# Patient Record
Sex: FEMALE | Race: WHITE | NOT HISPANIC OR LATINO | Employment: FULL TIME | URBAN - NONMETROPOLITAN AREA
[De-identification: names, ages, dates, MRNs, and addresses within clinical notes are randomized per-mention and may not be internally consistent; named-entity substitution may affect disease eponyms.]

---

## 2017-05-31 DIAGNOSIS — Z30.09 GENERAL COUNSELING FOR PRESCRIPTION OF ORAL CONTRACEPTIVES: ICD-10-CM

## 2017-06-01 RX ORDER — DROSPIRENONE AND ETHINYL ESTRADIOL TABLETS 0.02-3(28)
KIT ORAL
Qty: 84 TABLET | Refills: 0 | Status: SHIPPED | OUTPATIENT
Start: 2017-06-01 | End: 2017-06-23

## 2017-06-23 ENCOUNTER — OFFICE VISIT (OUTPATIENT)
Dept: OBGYN | Facility: OTHER | Age: 27
End: 2017-06-23
Attending: NURSE PRACTITIONER
Payer: COMMERCIAL

## 2017-06-23 VITALS
DIASTOLIC BLOOD PRESSURE: 64 MMHG | WEIGHT: 174 LBS | HEIGHT: 61 IN | BODY MASS INDEX: 32.85 KG/M2 | SYSTOLIC BLOOD PRESSURE: 102 MMHG | HEART RATE: 72 BPM

## 2017-06-23 DIAGNOSIS — Z30.09 GENERAL COUNSELING FOR PRESCRIPTION OF ORAL CONTRACEPTIVES: ICD-10-CM

## 2017-06-23 DIAGNOSIS — Z01.419 WELL WOMAN EXAM WITH ROUTINE GYNECOLOGICAL EXAM: Primary | ICD-10-CM

## 2017-06-23 PROCEDURE — 99395 PREV VISIT EST AGE 18-39: CPT | Performed by: NURSE PRACTITIONER

## 2017-06-23 RX ORDER — DROSPIRENONE AND ETHINYL ESTRADIOL 0.02-3(28)
1 KIT ORAL DAILY
Qty: 84 TABLET | Refills: 4 | Status: SHIPPED | OUTPATIENT
Start: 2017-06-23 | End: 2018-07-20

## 2017-06-23 ASSESSMENT — PAIN SCALES - GENERAL: PAINLEVEL: NO PAIN (0)

## 2017-06-23 NOTE — NURSING NOTE
"Chief Complaint   Patient presents with     Physical       Initial /64  Pulse 72  Ht 5' 1\" (1.549 m)  Wt 174 lb (78.9 kg)  LMP 06/11/2017 (Approximate)  Breastfeeding? No  BMI 32.88 kg/m2 Estimated body mass index is 32.88 kg/(m^2) as calculated from the following:    Height as of this encounter: 5' 1\" (1.549 m).    Weight as of this encounter: 174 lb (78.9 kg).  Medication Reconciliation: complete     WHIT BRITO      "

## 2017-06-23 NOTE — MR AVS SNAPSHOT
After Visit Summary   6/23/2017    Sandra Sarabia    MRN: [id number]           Patient Information     Date Of Birth          1990        Visit Information        Provider Department      6/23/2017 3:30 PM Marisol Jeffery NP [facility]        Today's Diagnoses     Well woman exam with routine gynecological exam    -  1    General counseling for prescription of oral contraceptives          Care Instructions      Breast Health: Breast Self-Awareness  What is breast self-awareness?  Breast self-awareness is knowing how your breasts normally look and feel. Your breasts change as you go through different stages of your life. So it s important to learn what is normal for your breasts. Breast self-awareness helps you notice any changes in your breasts right away. Report any changes to your healthcare provider.  Why is breast self-awareness important?  Many experts now say that women should focus on breast self-awareness instead of doing a breast self-examination (BSE). These experts include the American Cancer Society, the U.S. Preventive Services Task Force, and the American Congress of Obstetricians and Gynecologists. Some experts even advise not teaching women to do a BSE. That s because research hasn t shown a clear benefit to doing BSEs.  Breast self-awareness is different than a BSE. Breast self-awareness isn t about following a certain method and schedule. It s about knowing what's normal for your breasts. That way you can notice even small changes right away. If you see any changes, report them to your healthcare provider.  Changes to look for  Call your healthcare provider if you find any changes in your breasts that concern you. These changes may include:    A lump    Nipple discharge other than breast milk, especially a bloody discharge    Swelling    A change in size or shape    Skin irritation, such as redness, thickening, or dimpling of the skin    Swollen lymph nodes in the  armpit    Nipple problems, such as pain or redness  If you find a lump  Contact your provider if you find lumpiness in one breast, feel something different in the tissue, or feel a definite lump. Sometimes lumpiness may be due to menstrual changes. But there may be reason for concern.  Your provider may want to see you right away if you have:    Nipple discharge that is bloody    Skin changes on your breast, such as dimpling or puckering  It s normal to be upset if you find a lump. But it s important to contact your provider right away. Remember that most breast lumps are benign. This means they are not cancer.  Date Last Reviewed: 8/10/2015    5064-7286 [employer]. [address] [place] [address], [place] [address]. All rights reserved. This information is not intended as a substitute for professional medical care. Always follow your healthcare professional's instructions.                Follow-ups after your visit        Follow-up notes from your care team     Return in about 1 year (around 6/23/2018).      Who to contact     If you have questions or need follow up information about today's clinic visit or your schedule please contact [facility] directly at [phone].  Normal or non-critical lab and imaging results will be communicated to you by MyChart, letter or phone within 4 business days after the clinic has received the results. If you do not hear from us within 7 days, please contact the clinic through [facility] or phone. If you have a critical or abnormal lab result, we will notify you by phone as soon as possible.  Submit refill requests through [employer] or call your pharmacy and they will forward the refill request to us. Please allow 3 business days for your refill to be completed.          Additional Information About Your Visit        [facility] Mail Information     [employer] gives you secure access to your electronic health record. If you see a primary care provider, you can also send  "messages to your care team and make appointments. If you have questions, please call your primary care clinic.  If you do not have a primary care provider, please call [phone] and they will assist you.        Care EveryWhere ID     This is your Care EveryWhere ID. This could be used by other organizations to access your [facility] [place] medical records  [id number]        Your Vitals Were     Pulse Height Last Period Breastfeeding? BMI (Body Mass Index)       72 5' 1\" (1.549 m) 06/11/2017 (Approximate) No 32.88 kg/m2        Blood Pressure from Last 3 Encounters:   06/23/17 102/64   07/21/16 108/68   07/13/16 104/68    Weight from Last 3 Encounters:   06/23/17 174 lb (78.9 kg)   07/21/16 178 lb (80.7 kg)   07/13/16 180 lb (81.6 kg)              Today, you had the following     No orders found for display         Today's Medication Changes          These changes are accurate as of: 6/23/17 11:59 PM.  If you have any questions, ask your nurse or doctor.               These medicines have changed or have updated prescriptions.        Dose/Directions    drospirenone-ethinyl estradiol 3-0.02 MG per tablet   Commonly known as:  LORYNA   This may have changed:  See the new instructions.   Used for:  General counseling for prescription of oral contraceptives   Changed by:  Marisol Jeffery, NP        Dose:  1 tablet   Take 1 tablet by mouth daily   Quantity:  84 tablet   Refills:  4            Where to get your medicines      These medications were sent to [place] [address] [place] [address] [address]     Phone:  [phone]     drospirenone-ethinyl estradiol 3-0.02 MG per tablet                Primary Care Provider    None Specified       No primary provider on file.        Equal Access to Services     OUMOU WYATT : Arthur Watson, stephon chavira, harlan krishna. [facility] [phone].    ATENCIÓN: Si habla " español, tiene a javier disposición servicios gratuitos de asistencia lingüística. Lila goins [phone].    We comply with applicable federal civil rights laws and [place] laws. We do not discriminate on the basis of race, color, national origin, age, disability sex, sexual orientation or gender identity.            Thank you!     [facility]  [facility]. Our goal is always to provide you with excellent care. Hearing back from our patients is one way we can continue to improve our services. Please take a few minutes to complete the written survey that you may receive in the mail after your visit with us. Thank you!             Your Updated Medication List - Protect others around you: Learn how to safely use, store and throw away your medicines at www.disposemymeds.org.          This list is accurate as of: 6/23/17 11:59 PM.  Always use your most recent med list.                   Brand Name Dispense Instructions for use Diagnosis    drospirenone-ethinyl estradiol 3-0.02 MG per tablet    LORYNA    84 tablet    Take 1 tablet by mouth daily    General counseling for prescription of oral contraceptives

## 2017-06-27 NOTE — PROGRESS NOTES
"CC:  Annual exam  HPI:  Sandra Sarabia is a 26 year old female  Patient's last menstrual period was 06/11/2017 (approximate).  She would like to refill her BCP.  She is not established with a PCP.  Information provided. No other c/o.      Past GYN history:  No STD history  STI testing offered?  Declined       Last PAP smear:  Normal  Mammograms up to date: not applicable    No past medical history on file.    Past Surgical History:   Procedure Laterality Date     SOFT TISSUE SURGERY      right inner thigh I and D       Family History   Problem Relation Age of Onset     Hypertension Mother      Coronary Artery Disease Maternal Grandmother      DIABETES Paternal Grandfather      Hyperlipidemia No family hx of      Asthma No family hx of      Thyroid Disease No family hx of      Colon Cancer No family hx of      Breast Cancer No family hx of        Current Outpatient Prescriptions   Medication Sig Dispense Refill     drospirenone-ethinyl estradiol (LORYNA) 3-0.02 MG per tablet Take 1 tablet by mouth daily 84 tablet 4       Allergies: Amoxicillin and Penicillins    ROS:  C: NEGATIVE for fever, chills, change in weight  I: NEGATIVE for worrisome rashes, moles or lesions  E: NEGATIVE for vision changes or irritation  E/M: NEGATIVE for ear, mouth and throat problems  R: NEGATIVE for significant cough or SOB  CV: NEGATIVE for chest pain, palpitations or peripheral edema  GI: NEGATIVE for nausea, abdominal pain, heartburn, or change in bowel habits  : NEGATIVE for frequency, dysuria, hematuria, vaginal discharge, or irregular bleeding  M: NEGATIVE for significant arthralgias or myalgia  N: NEGATIVE for weakness, dizziness or paresthesias  E: NEGATIVE for temperature intolerance, skin/hair changes  P: NEGATIVE for changes in mood or affect    EXAM:  Blood pressure 102/64, pulse 72, height 5' 1\" (1.549 m), weight 174 lb (78.9 kg), last menstrual period 06/11/2017, not currently breastfeeding.   BMI= Body mass index is 32.88 " kg/(m^2).  General - pleasant female in no acute distress.  Neck - supple without lymphadenopathy or thyromegaly.  Lungs - clear to auscultation bilaterally.  Heart - regular rate and rhythm without murmur.  Breast - no nodularity, asymmetry or nipple discharge bilaterally.  Abdomen - soft, nontender, nondistended, no hepatosplenomegaly.  Pelvic - EG: normal adult female, BUS: within normal limits, Vagina: well rugated, no discharge, Cervix: no lesions or CMT, Uterus: firm, normal sized and nontender, Adnexae: no masses or tenderness.  Rectovaginal - deferred.  Musculoskeletal - no gross deformities.  Neurological - normal strength, sensation, and mental status.      ASSESSMENT/PLAN:  (Z01.419) Encounter for gynecological examination without abnormal finding  (primary encounter diagnosis)  Comment:   Plan: return in 1 year    (Z30.09) General counseling for prescription of oral contraceptives  Comment:   Plan: drospirenone-ethinyl estradiol (LORYNA) 3-0.02         MG per tablet              Discussed exercise and healthy eating, including calcium intake.  She should return to the clinic in one year, or sooner if problems arise.

## 2017-06-27 NOTE — PATIENT INSTRUCTIONS
Breast Health: Breast Self-Awareness  What is breast self-awareness?  Breast self-awareness is knowing how your breasts normally look and feel. Your breasts change as you go through different stages of your life. So it s important to learn what is normal for your breasts. Breast self-awareness helps you notice any changes in your breasts right away. Report any changes to your healthcare provider.  Why is breast self-awareness important?  Many experts now say that women should focus on breast self-awareness instead of doing a breast self-examination (BSE). These experts include the American Cancer Society, the U.S. Preventive Services Task Force, and the American Congress of Obstetricians and Gynecologists. Some experts even advise not teaching women to do a BSE. That s because research hasn t shown a clear benefit to doing BSEs.  Breast self-awareness is different than a BSE. Breast self-awareness isn t about following a certain method and schedule. It s about knowing what's normal for your breasts. That way you can notice even small changes right away. If you see any changes, report them to your healthcare provider.  Changes to look for  Call your healthcare provider if you find any changes in your breasts that concern you. These changes may include:    A lump    Nipple discharge other than breast milk, especially a bloody discharge    Swelling    A change in size or shape    Skin irritation, such as redness, thickening, or dimpling of the skin    Swollen lymph nodes in the armpit    Nipple problems, such as pain or redness  If you find a lump  Contact your provider if you find lumpiness in one breast, feel something different in the tissue, or feel a definite lump. Sometimes lumpiness may be due to menstrual changes. But there may be reason for concern.  Your provider may want to see you right away if you have:    Nipple discharge that is bloody    Skin changes on your breast, such as dimpling or puckering  It s  normal to be upset if you find a lump. But it s important to contact your provider right away. Remember that most breast lumps are benign. This means they are not cancer.  Date Last Reviewed: 8/10/2015    2131-2518 [employer]. [address] [place] [address], [place] [address]. All rights reserved. This information is not intended as a substitute for professional medical care. Always follow your healthcare professional's instructions.

## 2018-07-20 ENCOUNTER — OFFICE VISIT (OUTPATIENT)
Dept: OBGYN | Facility: OTHER | Age: 28
End: 2018-07-20
Attending: NURSE PRACTITIONER
Payer: COMMERCIAL

## 2018-07-20 VITALS
HEART RATE: 76 BPM | BODY MASS INDEX: 33.82 KG/M2 | OXYGEN SATURATION: 99 % | DIASTOLIC BLOOD PRESSURE: 62 MMHG | SYSTOLIC BLOOD PRESSURE: 104 MMHG | WEIGHT: 179 LBS

## 2018-07-20 DIAGNOSIS — Z12.4 SCREENING FOR CERVICAL CANCER: Primary | ICD-10-CM

## 2018-07-20 DIAGNOSIS — Z01.419 WELL WOMAN EXAM WITH ROUTINE GYNECOLOGICAL EXAM: ICD-10-CM

## 2018-07-20 DIAGNOSIS — Z30.09 GENERAL COUNSELING FOR PRESCRIPTION OF ORAL CONTRACEPTIVES: ICD-10-CM

## 2018-07-20 PROCEDURE — 87624 HPV HI-RISK TYP POOLED RSLT: CPT | Mod: 90 | Performed by: NURSE PRACTITIONER

## 2018-07-20 PROCEDURE — 99000 SPECIMEN HANDLING OFFICE-LAB: CPT | Performed by: NURSE PRACTITIONER

## 2018-07-20 PROCEDURE — 99395 PREV VISIT EST AGE 18-39: CPT | Performed by: NURSE PRACTITIONER

## 2018-07-20 PROCEDURE — 88142 CYTOPATH C/V THIN LAYER: CPT | Performed by: NURSE PRACTITIONER

## 2018-07-20 RX ORDER — DROSPIRENONE AND ETHINYL ESTRADIOL 0.02-3(28)
1 KIT ORAL DAILY
Qty: 84 TABLET | Refills: 4 | Status: SHIPPED | OUTPATIENT
Start: 2018-07-20 | End: 2019-08-27

## 2018-07-20 ASSESSMENT — PAIN SCALES - GENERAL: PAINLEVEL: NO PAIN (0)

## 2018-07-20 NOTE — PATIENT INSTRUCTIONS
Clinical Breast Exam    Many health organizations recommend a yearly clinical breast exam. This exam may be done by a gynecologist, family healthcare provider, nurse practitioner, nurse midwife, or specially trained nurse. Yearly breast exams help to make sure that breast conditions are found early.  Your healthcare provider s role  A healthcare professional knows the tests and follow-up care needed if a problem is found. Your clinical exam is also a great time to ask questions about breast self-exams. You can find out if you re checking your breasts in the best way. Or you may want to ask how pregnancy, breast implants, or breast reduction surgery affect the way you should check your breasts.  Diagnostic tests  If a clinical exam reveals a breast change, you may have other tests to find out more. These tests may include:    Mammography. A low-dose X-ray of your breast tissue.    Ultrasound. An imaging test that uses sound waves to create images of your breast.    Biopsy. A small amount of breast tissue is removed by needle or by a cut (incision). The tissue is then checked under a microscope.  Guidelines for having clinical breast exams  The American College of Obstetricians and Gynecologists recommends that starting at age 29, you should have a clinical breast exam every 1 to 3 years. After age 40, have a clinical breast exam each year. If you re at higher risk for breast cancer, you may need exams more often. Risk factors for breast cancer may include:    Being over 50 or postmenopausal    Having a family history of breast cancer    Having the BRCA1 or BRCA2 gene mutation or certain other gene mutations    Having more menstrual periods due to starting menstruation early (before age 12) or having a late menopause (after age 55)    Having no pregnancies    Having a first pregnancy after age 30    Being obese    Having a history of radiation treatment to your chest area    Exposure to BALBIR during your mother's  pregnancy    Not being active    Drinking too much alcohol    Having dense breast tissue    Taking hormone therapy after menopause  Other health organizations have different recommendations. Talk with your healthcare provider about what is best for you.   Date Last Reviewed: 8/1/2017 2000-2017 [employer]. [address] [place] [address], [place] [address]. All rights reserved. This information is not intended as a substitute for professional medical care. Always follow your healthcare professional's instructions.        Breast Health: Breast Self-Awareness  What is breast self-awareness?  Breast self-awareness is knowing how your breasts normally look and feel. Your breasts change as you go through different stages of your life. So it s important to learn what is normal for your breasts. Breast self-awareness helps you notice any changes in your breasts right away. Report any changes to your healthcare provider.  Why is breast self-awareness important?  Many experts now say that women should focus on breast self-awareness instead of doing a breast self-examination (BSE). These experts include the American Cancer Society, the U.S. Preventive Services Task Force, and the American Congress of Obstetricians and Gynecologists. Some experts even advise not teaching women to do a BSE. That s because research hasn t shown a clear benefit to doing BSEs.  Breast self-awareness is different than a BSE. Breast self-awareness isn t about following a certain method and schedule. It s about knowing what's normal for your breasts. That way you can notice even small changes right away. If you see any changes, report them to your healthcare provider.  Changes to look for  Call your healthcare provider if you find any changes in your breasts that concern you. These changes may include:    A lump    Nipple discharge other than breastmilk, especially a bloody discharge    Swelling    A change in size or shape    Skin  irritation, such as redness, thickening, or dimpling of the skin    Swollen lymph nodes in the armpit    Nipple problems, such as pain or redness  If you find a lump  Contact your provider if you find lumpiness in one breast, feel something different in the tissue, or feel a definite lump. Sometimes lumpiness may be due to menstrual changes. But there may be reason for concern.  Your provider may want to see you right away if you have:    Nipple discharge that is bloody    Skin changes on your breast, such as dimpling or puckering  It s normal to be upset if you find a lump. But it s important to contact your provider right away. Remember that most breast lumps are benign. This means they are not cancer.  Date Last Reviewed: 8/1/2017 2000-2017 [employer]. [address] [place] [address], [place] [address]. All rights reserved. This information is not intended as a substitute for professional medical care. Always follow your healthcare professional's instructions.

## 2018-07-20 NOTE — NURSING NOTE
"Chief Complaint   Patient presents with     Physical       Initial /62 (BP Location: Left arm, Patient Position: Sitting, Cuff Size: Adult Regular)  Pulse 76  Wt 179 lb (81.2 kg)  SpO2 99%  BMI 33.82 kg/m2 Estimated body mass index is 33.82 kg/(m^2) as calculated from the following:    Height as of 6/23/17: 5' 1\" (1.549 m).    Weight as of this encounter: 179 lb (81.2 kg).  Medication Reconciliation: complete    Kate Cowan LPN  "

## 2018-07-20 NOTE — MR AVS SNAPSHOT
After Visit Summary   7/20/2018    Sandra Sarabia    MRN: [id number]           Patient Information     Date Of Birth          1990        Visit Information        Provider Department      7/20/2018 11:00 AM Marisol Jeffery NP [facility] [place]        Today's Diagnoses     Screening for cervical cancer    -  1    General counseling for prescription of oral contraceptives        Well woman exam with routine gynecological exam          Care Instructions      Clinical Breast Exam    Many health organizations recommend a yearly clinical breast exam. This exam may be done by a gynecologist, family healthcare provider, nurse practitioner, nurse midwife, or specially trained nurse. Yearly breast exams help to make sure that breast conditions are found early.  Your healthcare provider s role  A healthcare professional knows the tests and follow-up care needed if a problem is found. Your clinical exam is also a great time to ask questions about breast self-exams. You can find out if you re checking your breasts in the best way. Or you may want to ask how pregnancy, breast implants, or breast reduction surgery affect the way you should check your breasts.  Diagnostic tests  If a clinical exam reveals a breast change, you may have other tests to find out more. These tests may include:    Mammography. A low-dose X-ray of your breast tissue.    Ultrasound. An imaging test that uses sound waves to create images of your breast.    Biopsy. A small amount of breast tissue is removed by needle or by a cut (incision). The tissue is then checked under a microscope.  Guidelines for having clinical breast exams  The American College of Obstetricians and Gynecologists recommends that starting at age 29, you should have a clinical breast exam every 1 to 3 years. After age 40, have a clinical breast exam each year. If you re at higher risk for breast cancer, you may need exams more often. Risk factors for breast  cancer may include:    Being over 50 or postmenopausal    Having a family history of breast cancer    Having the BRCA1 or BRCA2 gene mutation or certain other gene mutations    Having more menstrual periods due to starting menstruation early (before age 12) or having a late menopause (after age 55)    Having no pregnancies    Having a first pregnancy after age 30    Being obese    Having a history of radiation treatment to your chest area    Exposure to BALBIR during your mother's pregnancy    Not being active    Drinking too much alcohol    Having dense breast tissue    Taking hormone therapy after menopause  Other health organizations have different recommendations. Talk with your healthcare provider about what is best for you.   Date Last Reviewed: 8/1/2017 2000-2017 [employer]. [address] [place] [address], [place] [address]. All rights reserved. This information is not intended as a substitute for professional medical care. Always follow your healthcare professional's instructions.        Breast Health: Breast Self-Awareness  What is breast self-awareness?  Breast self-awareness is knowing how your breasts normally look and feel. Your breasts change as you go through different stages of your life. So it s important to learn what is normal for your breasts. Breast self-awareness helps you notice any changes in your breasts right away. Report any changes to your healthcare provider.  Why is breast self-awareness important?  Many experts now say that women should focus on breast self-awareness instead of doing a breast self-examination (BSE). These experts include the American Cancer Society, the U.S. Preventive Services Task Force, and the American Congress of Obstetricians and Gynecologists. Some experts even advise not teaching women to do a BSE. That s because research hasn t shown a clear benefit to doing BSEs.  Breast self-awareness is different than a BSE. Breast self-awareness isn t about  following a certain method and schedule. It s about knowing what's normal for your breasts. That way you can notice even small changes right away. If you see any changes, report them to your healthcare provider.  Changes to look for  Call your healthcare provider if you find any changes in your breasts that concern you. These changes may include:    A lump    Nipple discharge other than breastmilk, especially a bloody discharge    Swelling    A change in size or shape    Skin irritation, such as redness, thickening, or dimpling of the skin    Swollen lymph nodes in the armpit    Nipple problems, such as pain or redness  If you find a lump  Contact your provider if you find lumpiness in one breast, feel something different in the tissue, or feel a definite lump. Sometimes lumpiness may be due to menstrual changes. But there may be reason for concern.  Your provider may want to see you right away if you have:    Nipple discharge that is bloody    Skin changes on your breast, such as dimpling or puckering  It s normal to be upset if you find a lump. But it s important to contact your provider right away. Remember that most breast lumps are benign. This means they are not cancer.  Date Last Reviewed: 8/1/2017 2000-2017 [employer]. [address] [place] [address], [place] [address]. All rights reserved. This information is not intended as a substitute for professional medical care. Always follow your healthcare professional's instructions.                Follow-ups after your visit        Follow-up notes from your care team     Return in about 1 year (around 7/20/2019).      Who to contact     If you have questions or need follow up information about today's clinic visit or your schedule please contact [facility] directly at [phone].  Normal or non-critical lab and imaging results will be communicated to you by MyChart, letter or phone within 4 business days after the clinic has received the  results. If you do not hear from us within 7 days, please contact the clinic through [employer] or phone. If you have a critical or abnormal lab result, we will notify you by phone as soon as possible.  Submit refill requests through [employer] or call your pharmacy and they will forward the refill request to us. Please allow 3 business days for your refill to be completed.          Additional Information About Your Visit        [email or web address] Information     [employer] gives you secure access to your electronic health record. If you see a primary care provider, you can also send messages to your care team and make appointments. If you have questions, please call your primary care clinic.  If you do not have a primary care provider, please call [phone] and they will assist you.        Care EveryWhere ID     This is your Care EveryWhere ID. This could be used by other organizations to access your [place] medical records  [id number]        Your Vitals Were     Pulse Pulse Oximetry BMI (Body Mass Index)             76 99% 33.82 kg/m2          Blood Pressure from Last 3 Encounters:   07/20/18 104/62   06/23/17 102/64   07/21/16 108/68    Weight from Last 3 Encounters:   07/20/18 179 lb (81.2 kg)   06/23/17 174 lb (78.9 kg)   07/21/16 178 lb (80.7 kg)              We Performed the Following     A pap thin layer screen with  HPV - recommended age 30 - 65 years (select HPV order below)     HPV High Risk Types DNA Cervical          Where to get your medicines      These medications were sent to [facility] [address] [address] AT [facility] [address]  [facility] [address], [place] [address]     Phone:  [phone]     drospirenone-ethinyl estradiol 3-0.02 MG per tablet          Primary Care Provider    None Specified       No primary provider on file.        Equal Access to Services     ALVIN WYATT : stephon Cheng, harlan krishna  bulmaro delgadofelyashtyn pfeifferAlekimmie ah. So [facility] [phone].    ATENCIÓN: Si habla florin, tiene a javier disposición servicios gratuitos de asistencia lingüística. Lila al [phone].    We comply with applicable federal civil rights laws and [place] laws. We do not discriminate on the basis of race, color, national origin, age, disability, sex, sexual orientation, or gender identity.            Thank you!     Thank you for choosing [facility]  for your care. Our goal is always to provide you with excellent care. Hearing back from our patients is one way we can continue to improve our services. Please take a few minutes to complete the written survey that you may receive in the mail after your visit with us. Thank you!             Your Updated Medication List - Protect others around you: Learn how to safely use, store and throw away your medicines at www.disposemymeds.org.          This list is accurate as of 7/20/18 11:24 AM.  Always use your most recent med list.                   Brand Name Dispense Instructions for use Diagnosis    drospirenone-ethinyl estradiol 3-0.02 MG per tablet    LORYNA    84 tablet    Take 1 tablet by mouth daily    General counseling for prescription of oral contraceptives

## 2018-07-20 NOTE — PROGRESS NOTES
CC:  Annual exam  HPI:  Sandra Sarabia is a 27 year old female P0 No LMP recorded.  Would like to refill her BCP.  Declines discussion of smoking cessation.  Does not get regular exercise.  No other c/o.      Past GYN history:  No STD history  STI testing offered?  Declined       Last PAP smear:  Normal  Mammograms up to date: not applicable      No past medical history on file.    Past Surgical History:   Procedure Laterality Date     SOFT TISSUE SURGERY      right inner thigh I and D       Family History   Problem Relation Age of Onset     Hypertension Mother      Coronary Artery Disease Maternal Grandmother      Diabetes Paternal Grandfather      Hyperlipidemia No family hx of      Asthma No family hx of      Thyroid Disease No family hx of      Colon Cancer No family hx of      Breast Cancer No family hx of        Current Outpatient Prescriptions   Medication Sig Dispense Refill     drospirenone-ethinyl estradiol (LORYNA) 3-0.02 MG per tablet Take 1 tablet by mouth daily 84 tablet 4     [DISCONTINUED] drospirenone-ethinyl estradiol (LORYNA) 3-0.02 MG per tablet Take 1 tablet by mouth daily 84 tablet 4       Allergies: Amoxicillin and Penicillins    ROS:  CONSTITUTIONAL:NEGATIVE for fever, chills, change in weight  INTEGUMENTARY/SKIN: NEGATIVE for worrisome rashes, moles or lesions  ENT/MOUTH: NEGATIVE for ear, mouth and throat problems  RESP:NEGATIVE for significant cough or SOB  BREAST: NEGATIVE for masses, tenderness or discharge  CV: NEGATIVE for chest pain, palpitations or peripheral edema  GI: NEGATIVE for nausea, abdominal pain, heartburn, or change in bowel habits  : negative for, dysparunia, incontinence and vaginal discharge  ENDOCRINE: NEGATIVE for temperature intolerance, skin/hair changes    EXAM:  Blood pressure 104/62, pulse 76, weight 179 lb (81.2 kg), SpO2 99 %, not currently breastfeeding.   BMI= Body mass index is 33.82 kg/(m^2).  General - pleasant female in no acute distress.  Neck - supple  without lymphadenopathy or thyromegaly.  Lungs - clear to auscultation bilaterally.  Heart - regular rate and rhythm without murmur.  Breast - no nodularity, asymmetry or nipple discharge bilaterally.  Abdomen - soft, nontender, nondistended, no hepatosplenomegaly.  Pelvic - EG: normal adult female, BUS: within normal limits, Vagina: well rugated, no discharge, Cervix: no lesions or CMT, Uterus: firm, normal sized and nontender, Adnexae: no masses or tenderness.  Rectovaginal - deferred.  Musculoskeletal - no gross deformities.  Neurological - normal strength, sensation, and mental status.      ASSESSMENT/PLAN:  (Z12.4) Screening for cervical cancer  (primary encounter diagnosis)  Comment:   Plan: A pap thin layer screen with  HPV - recommended        age 30 - 65 years (select HPV order below), HPV        High Risk Types DNA Cervical            (Z30.09) General counseling for prescription of oral contraceptives  Comment:   Plan: drospirenone-ethinyl estradiol (LORYNA) 3-0.02         MG per tablet            (Z01.419) Well woman exam with routine gynecological exam  Comment:   Plan: smoking cessation and regular exercise encouraged.      Discussed exercise and healthy eating, including calcium intake.  She should return to the clinic in one year, or sooner if problems arise.

## 2018-07-21 ASSESSMENT — PATIENT HEALTH QUESTIONNAIRE - PHQ9: SUM OF ALL RESPONSES TO PHQ QUESTIONS 1-9: 0

## 2018-07-26 LAB
COPATH REPORT: NORMAL
PAP: NORMAL

## 2018-07-27 LAB
FINAL DIAGNOSIS: NORMAL
HPV HR 12 DNA CVX QL NAA+PROBE: NEGATIVE
HPV16 DNA SPEC QL NAA+PROBE: NEGATIVE
HPV18 DNA SPEC QL NAA+PROBE: NEGATIVE
SPECIMEN DESCRIPTION: NORMAL
SPECIMEN SOURCE CVX/VAG CYTO: NORMAL

## 2019-08-27 ENCOUNTER — OFFICE VISIT (OUTPATIENT)
Dept: OBGYN | Facility: OTHER | Age: 29
End: 2019-08-27
Attending: NURSE PRACTITIONER
Payer: COMMERCIAL

## 2019-08-27 VITALS
HEIGHT: 61 IN | DIASTOLIC BLOOD PRESSURE: 73 MMHG | OXYGEN SATURATION: 97 % | WEIGHT: 190 LBS | BODY MASS INDEX: 35.87 KG/M2 | SYSTOLIC BLOOD PRESSURE: 110 MMHG | HEART RATE: 92 BPM

## 2019-08-27 DIAGNOSIS — Z30.09 GENERAL COUNSELING FOR PRESCRIPTION OF ORAL CONTRACEPTIVES: ICD-10-CM

## 2019-08-27 DIAGNOSIS — Z71.6 TOBACCO ABUSE COUNSELING: ICD-10-CM

## 2019-08-27 DIAGNOSIS — Z12.4 SCREENING FOR CERVICAL CANCER: Primary | ICD-10-CM

## 2019-08-27 DIAGNOSIS — Z01.419 WELL WOMAN EXAM WITH ROUTINE GYNECOLOGICAL EXAM: ICD-10-CM

## 2019-08-27 PROCEDURE — 87624 HPV HI-RISK TYP POOLED RSLT: CPT | Mod: 90 | Performed by: NURSE PRACTITIONER

## 2019-08-27 PROCEDURE — G0123 SCREEN CERV/VAG THIN LAYER: HCPCS | Performed by: NURSE PRACTITIONER

## 2019-08-27 PROCEDURE — 99000 SPECIMEN HANDLING OFFICE-LAB: CPT | Performed by: NURSE PRACTITIONER

## 2019-08-27 PROCEDURE — 99395 PREV VISIT EST AGE 18-39: CPT | Performed by: NURSE PRACTITIONER

## 2019-08-27 RX ORDER — DROSPIRENONE AND ETHINYL ESTRADIOL 0.02-3(28)
1 KIT ORAL DAILY
Qty: 84 TABLET | Refills: 4 | Status: SHIPPED | OUTPATIENT
Start: 2019-08-27 | End: 2020-10-09

## 2019-08-27 ASSESSMENT — MIFFLIN-ST. JEOR: SCORE: 1519.21

## 2019-08-27 ASSESSMENT — PATIENT HEALTH QUESTIONNAIRE - PHQ9: SUM OF ALL RESPONSES TO PHQ QUESTIONS 1-9: 0

## 2019-08-27 ASSESSMENT — PAIN SCALES - GENERAL: PAINLEVEL: NO PAIN (0)

## 2019-08-27 NOTE — NURSING NOTE
"Chief Complaint   Patient presents with     Annual Visit       Initial /73 (BP Location: Right arm, Patient Position: Sitting, Cuff Size: Adult Regular)   Pulse 92   Ht 1.549 m (5' 1\")   Wt 86.2 kg (190 lb)   SpO2 97%   BMI 35.90 kg/m   Estimated body mass index is 35.9 kg/m  as calculated from the following:    Height as of this encounter: 1.549 m (5' 1\").    Weight as of this encounter: 86.2 kg (190 lb).  Medication Reconciliation: complete     Naatlie Vera LPN      "

## 2019-08-27 NOTE — PATIENT INSTRUCTIONS
Patient Education     Breast Health: Breast Self-Awareness  What is breast self-awareness?  Breast self-awareness is knowing how your breasts normally look and feel. Your breasts change as you go through different stages of your life. So it s important to learn what is normal for your breasts. Breast self-awareness helps you notice any changes in your breasts right away. Report any changes to your healthcare provider.  Why is breast self-awareness important?  Many experts now say that women should focus on breast self-awareness instead of doing a breast self-examination (BSE). These experts include the American Cancer Society, the U.S. Preventive Services Task Force, and the American Congress of Obstetricians and Gynecologists. Some experts even advise not teaching women to do a BSE. That s because research hasn t shown a clear benefit to doing BSEs.  Breast self-awareness is different than a BSE. Breast self-awareness isn t about following a certain method and schedule. It s about knowing what's normal for your breasts. That way you can notice even small changes right away. If you see any changes, report them to your healthcare provider.  Changes to look for  Call your healthcare provider if you find any changes in your breasts that concern you. These changes may include:    A lump    Nipple discharge other than breastmilk, especially a bloody discharge    Swelling    A change in size or shape    Skin irritation, such as redness, thickening, or dimpling of the skin    Swollen lymph nodes in the armpit    Nipple problems, such as pain or redness  If you find a lump  Contact your provider if you find lumpiness in one breast, feel something different in the tissue, or feel a definite lump. Sometimes lumpiness may be due to menstrual changes. But there may be reason for concern.  Your provider may want to see you right away if you have:    Nipple discharge that is bloody    Skin changes on your breast, such as  dimpling or puckering  It s normal to be upset if you find a lump. But it s important to contact your provider right away. Remember that most breast lumps are benign. This means they are not cancer.  Date Last Reviewed: 8/1/2017 2000-2018 [employer]. [address] [place] [address], [place] [address]. All rights reserved. This information is not intended as a substitute for professional medical care. Always follow your healthcare professional's instructions.

## 2019-08-27 NOTE — PROGRESS NOTES
"CC:  Annual exam  HPI:  Sandra Sarabia is a 28 year old female P0 No LMP recorded.  She denies problems or concerns.  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  Smoking occasionally and counseled on complete cessation. No other c/o.      Past GYN history:  No STD history  STI testing offered?  Declined       Last PAP smear:  Normal  Mammograms up to date: not applicable  No past medical history on file.    Past Surgical History:   Procedure Laterality Date     SOFT TISSUE SURGERY      right inner thigh I and D       Family History   Problem Relation Age of Onset     Hypertension Mother      Coronary Artery Disease Maternal Grandmother      Diabetes Paternal Grandfather      Hyperlipidemia No family hx of      Asthma No family hx of      Thyroid Disease No family hx of      Colon Cancer No family hx of      Breast Cancer No family hx of        Current Outpatient Medications   Medication Sig Dispense Refill     drospirenone-ethinyl estradiol (LORYNA) 3-0.02 MG tablet Take 1 tablet by mouth daily 84 tablet 4       Allergies: Amoxicillin and Penicillins    ROS:  CONSTITUTIONAL:NEGATIVE for fever, chills, change in weight  RESP:NEGATIVE for significant cough or SOB  BREAST: NEGATIVE for masses, tenderness or discharge  CV: NEGATIVE for chest pain, palpitations or peripheral edema  GI: NEGATIVE for nausea, abdominal pain, heartburn, or change in bowel habits  : normal menstrual cycles  PSYCHIATRIC: NEGATIVE for changes in mood or affect    EXAM:  Blood pressure 110/73, pulse 92, height 1.549 m (5' 1\"), weight 86.2 kg (190 lb), SpO2 97 %, not currently breastfeeding.   BMI= Body mass index is 35.9 kg/m .  General - pleasant female in no acute distress.  Neck - supple without lymphadenopathy or thyromegaly.  Lungs - clear to auscultation bilaterally.  Heart - regular rate and rhythm without murmur.  Breast - no nodularity, asymmetry or nipple discharge bilaterally.  Abdomen - soft, nontender, nondistended, no " hepatosplenomegaly.  Pelvic - EG: normal adult female, BUS: within normal limits, Vagina: well rugated, no discharge, Cervix: no lesions or CMT, Uterus: firm, normal sized and nontender, Adnexae: no masses or tenderness.  Rectovaginal - deferred.  Musculoskeletal - no gross deformities.  Neurological - normal strength, sensation, and mental status.      ASSESSMENT/PLAN:  (Z12.4) Screening for cervical cancer  (primary encounter diagnosis)  Comment:   Plan: A pap thin layer screen with  HPV - recommended        age 30 - 65 years (select HPV order below), HPV        High Risk Types DNA Cervical            (Z30.09) General counseling for prescription of oral contraceptives  Comment:   Plan: drospirenone-ethinyl estradiol (LORYNA) 3-0.02         MG tablet            (Z01.419) Well woman exam with routine gynecological exam  Comment:   Plan: return annually.    Tobacco abuse counseling.       Discussed exercise and healthy eating, including calcium intake.  She should return to the clinic in one year, or sooner if problems arise.

## 2019-09-03 LAB
COPATH REPORT: NORMAL
PAP: NORMAL

## 2020-03-10 ENCOUNTER — HEALTH MAINTENANCE LETTER (OUTPATIENT)
Age: 30
End: 2020-03-10

## 2020-10-09 ENCOUNTER — OFFICE VISIT (OUTPATIENT)
Dept: OBGYN | Facility: OTHER | Age: 30
End: 2020-10-09
Attending: NURSE PRACTITIONER
Payer: COMMERCIAL

## 2020-10-09 VITALS
DIASTOLIC BLOOD PRESSURE: 74 MMHG | BODY MASS INDEX: 34.93 KG/M2 | HEIGHT: 61 IN | SYSTOLIC BLOOD PRESSURE: 117 MMHG | WEIGHT: 185 LBS

## 2020-10-09 DIAGNOSIS — F41.9 ANXIETY: Primary | ICD-10-CM

## 2020-10-09 DIAGNOSIS — Z30.09 GENERAL COUNSELING FOR PRESCRIPTION OF ORAL CONTRACEPTIVES: ICD-10-CM

## 2020-10-09 DIAGNOSIS — Z01.419 WELL WOMAN EXAM WITH ROUTINE GYNECOLOGICAL EXAM: ICD-10-CM

## 2020-10-09 PROCEDURE — 99395 PREV VISIT EST AGE 18-39: CPT | Performed by: NURSE PRACTITIONER

## 2020-10-09 RX ORDER — DROSPIRENONE AND ETHINYL ESTRADIOL 0.02-3(28)
1 KIT ORAL DAILY
Qty: 84 TABLET | Refills: 4 | Status: SHIPPED | OUTPATIENT
Start: 2020-10-09 | End: 2021-11-02

## 2020-10-09 ASSESSMENT — MIFFLIN-ST. JEOR: SCORE: 1486.53

## 2020-10-09 ASSESSMENT — PATIENT HEALTH QUESTIONNAIRE - PHQ9: SUM OF ALL RESPONSES TO PHQ QUESTIONS 1-9: 4

## 2020-10-09 ASSESSMENT — PAIN SCALES - GENERAL: PAINLEVEL: NO PAIN (0)

## 2020-10-09 NOTE — PATIENT INSTRUCTIONS
Patient Education     Treating Anxiety Disorders with Therapy    If you have an anxiety disorder, you don t have to suffer anymore. Treatment is available. Therapy (also called counseling) is often a helpful treatment for anxiety disorders. With therapy, a specially trained professional (therapist) helps you face and learn to manage your anxiety. Therapy can be short-term or long-term depending on your needs. In some cases, medicine may also be prescribed with therapy. It may take time before you notice how much therapy is helping, but stick with it. With therapy, you can feel better.  Cognitive behavioral therapy (CBT)  Cognitive behavioral therapy (CBT) teaches you to manage anxiety. It does this by helping you understand how you think and act when you re anxious. Research has shown CBT to be a very effective treatment for anxiety disorders. How CBT is run is almost like a class. It involves homework and activities to build skills that teach you to cope with anxiety step by step. It can be done in a group or one-on-one, and often takes place for a set number of sessions. CBT has two main parts:    Cognitive therapy helps you identify the negative, irrational thoughts that occur with your anxiety. You ll learn to replace these with more positive, realistic thoughts.    Behavioral therapy helps you change how you react to anxiety. You ll learn coping skills and methods for relaxing to help you better deal with anxiety.  Other forms of therapy  Other therapy methods may work better for you than CBT. Or, you may move from CBT to another form of therapy as your treatment needs change. This may mean meeting with a therapist by yourself or in a group. Therapy can also help you work through problems in your life, such as drug or alcohol dependence, that may be making your anxiety worse.  Getting better takes time  Therapy will help you feel better and teach you skills to help manage anxiety long term. But change doesn t  happen right away. It takes a commitment from you. And treatment only works if you learn to face the causes of your anxiety. So, you might feel worse before you feel better. This can sometimes make it hard to stick with it. But remember: Therapy is a very effective treatment. The results will be well worth it.  Helping yourself  If anxiety is wearing you down, here are some things you can do to cope:    Check with your doctor and rule out any physical problems that may be causing the anxiety symptoms.    If an anxiety disorder is diagnosed, seek mental healthcare. This is an illness and it can respond to treatment. Most types of anxiety disorders will respond to talk therapy and medicine.    Educate yourself about anxiety disorders. Keep track of helpful online resources and books you can use during stressful periods.    Try stress management techniques such as meditation.    Consider online or in-person support groups.    Don t fight your feelings. Anxiety feeds itself. The more you worry about it, the worse it gets. Instead, try to identify what might have triggered your anxiety. Then try to put this threat in perspective.    Keep in mind that you can t control everything about a situation. Change what you can and let the rest take its course.    Exercise -- it s a great way to relieve tension and help your body feel relaxed.    Examine your life for stress, and try to find ways to reduce it.    Avoid caffeine and nicotine, which can make anxiety symptoms worse.    Fight the temptation to turn to alcohol or unprescribed drugs for relief. They only make things worse in the long run.   Date Last Reviewed: 1/1/2017 2000-2019 [employer]. [address] [place] [address], [place] [address]. All rights reserved. This information is not intended as a substitute for professional medical care. Always follow your healthcare professional's instructions.           Patient Education     Understanding Anxiety  Disorders  Almost everyone gets nervous now and then. It s normal to have knots in your stomach before a test, or for your heart to race on a first date. But an anxiety disorder is much more than a case of nerves. In fact, its symptoms may be overwhelming. But treatment can relieve many of these symptoms. Talking to your healthcare provider is the first step.    What are anxiety disorders?  An anxiety disorder causes intense feelings of panic and fear. These feelings may arise for no apparent reason. And they tend to recur again and again. They may prevent you from coping with life and cause you great distress. As a result, you may avoid anything that triggers your fear. In extreme cases, you may never leave the house. Anxiety disorders may cause other symptoms, such as:    Obsessive thoughts that are unwanted and you can t control    Constant nightmares or painful thoughts of the past    Nausea, sweating, and muscle tension    Trouble sleeping or concentrating  What causes anxiety disorders?  Anxiety disorders tend to run in families. For some people, childhood abuse or neglect may play a role. For others, stressful life events or trauma may trigger anxiety disorders. Anxiety can trigger low self-esteem and poor coping skills.    Panic disorder. This causes an intense fear of being in danger.    Phobias. These are extreme fears of certain objects, places, or events.    Obsessive-compulsive disorder. This causes you to have unwanted thoughts and urges. You also may perform certain actions over and over.    Posttraumatic stress disorder. This occurs in people who have survived a terrible ordeal. It can cause nightmares and flashbacks about the event.    Generalized anxiety disorder. This causes constant worry that can greatly disrupt your life.    Getting better  You may believe that nothing can help you. Or, you might fear what others may think. But most anxiety symptoms can be eased. Having an anxiety disorder is  nothing to be ashamed of. Most people do best with treatment that combines medicine and individual and group therapy. These aren t cures. But they can help you live a healthier life.  Date Last Reviewed: 2/1/2017 2000-2019 [employer]. [address] [place] [address], [place] [address]. All rights reserved. This information is not intended as a substitute for professional medical care. Always follow your healthcare professional's instructions.           Patient Education     Your Body s Response to Anxiety    Normal anxiety is part of the body s natural defense system. It's an alert to a threat that is unknown, vague, or comes from your own internal fears. While you re in this state, your feelings can range from a vague sense of worry to physical sensations such as a pounding heartbeat. These feelings make you want to react to the threat. An anxiety response is normal in many situations. But when you have an anxiety disorder, the same response can occur at the wrong times.  Anxiety can be helpful  Normal anxiety is a signal from your brain that warns you of a threat and is a normal response to help you prevent something or decrease the bad effects of something you can't control. For example, anxiety is a normal response to situations that might damage your body, separate you from a loved one, or lose your job. The symptoms of anxiety can be physical and mental.  How does it feel?  At certain times, people with anxiety may have:    Dizziness    Muscle tension or pain    Restlessness    Sleeplessness    Trouble concentrating    Racing heartbeat    Fast breathing    Shaking or trembling    Stomachache    Diarrhea    Loss of energy    Sweating    Cold, clammy hands    Chest pain    Dry mouth  Anxiety can also be a problem  Anxiety can become a problem when it is hard to control, occurs for months, and interferes with important parts of your life. With an anxiety disorder, your body has the response described  "above, but in inappropriate ways. The response a person has depends on the anxiety disorder he or she has. With some disorders, the anxiety is way out of proportion to the threat that triggers it. With others, anxiety may occur even when there isn t a clear threat or trigger.  Who does it affect?  Some people are more prone to persistent anxiety than others. It tends to run in families, and it affects more younger people than older people, and more women than men. But no age, race, or gender is immune to anxiety problems.  Anxiety can be treated  The good news is that the anxiety that s disrupting your life can be treated. Check with your healthcare provider and rule out any physical problems that may be causing the anxiety symptoms. If an anxiety disorder is diagnosed seek mental healthcare. This is an illness and it can respond to treatment. Most types of anxiety disorders will respond to \"talk therapy\" and medicines. Working with your doctor or other healthcare provider, you can develop skills to help you cope with anxiety. You can also gain the perspective you need to overcome your fears. Note: Good sources of support or guidance can be found at your local hospital, mental health clinic, or an employee assistance program.  How to cope with anxiety  If anxiety is wearing you down, here are some things you can do to cope:    Keep in mind that you can t control everything about a situation. Change what you can and let the rest take its course.    Exercise--it s a great way to relieve tension and help your body feel relaxed.    Avoid caffeine and nicotine, which can make anxiety symptoms worse.    Fight the temptation to turn to alcohol or unprescribed drugs for relief. They only make things worse in the long run.    Educate yourself about anxiety disorders. Keep track of helpful online resources and books you can use during stressful periods.    Try stress management techniques such as meditation.    Consider " online or in-person support groups.   Date Last Reviewed: 1/1/2017 2000-2019 [employer]. [address] [place] [address], [place] [address]. All rights reserved. This information is not intended as a substitute for professional medical care. Always follow your healthcare professional's instructions.

## 2020-10-09 NOTE — NURSING NOTE
"Chief Complaint   Patient presents with     Gyn Exam       Initial /74 (BP Location: Left arm, Patient Position: Chair, Cuff Size: Adult Regular)   Ht 1.549 m (5' 1\")   Wt 83.9 kg (185 lb)   BMI 34.96 kg/m   Estimated body mass index is 34.96 kg/m  as calculated from the following:    Height as of this encounter: 1.549 m (5' 1\").    Weight as of this encounter: 83.9 kg (185 lb).  Medication Reconciliation: complete  Melissa Deras LPN    "

## 2020-10-09 NOTE — PROGRESS NOTES
"CC:  Annual exam  HPI:  Sandra Sarabia is a 30 year old female P0 No LMP recorded.  Doing well on her BCP and wishes to continue.  Regular exercise and healthy diet.  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  She notes increasing anxiety and tearfulness.  \"I feel like I cry all of the time\".   No history of depression or anxiety.  She does live alone with her 2 dogs but feels she has strong work and family support.  No other c/o.      Past GYN history:  No STD history  STI testing offered?  Declined       Last PAP smear:  Normal  Mammograms up to date: not applicable    No past medical history on file.    Past Surgical History:   Procedure Laterality Date     SOFT TISSUE SURGERY      right inner thigh I and D       Family History   Problem Relation Age of Onset     Hypertension Mother      Coronary Artery Disease Maternal Grandmother      Diabetes Paternal Grandfather      Hyperlipidemia No family hx of      Asthma No family hx of      Thyroid Disease No family hx of      Colon Cancer No family hx of      Breast Cancer No family hx of        Current Outpatient Medications   Medication Sig Dispense Refill     Ascorbic Acid (VITAMIN C PO)        drospirenone-ethinyl estradiol (LORYNA) 3-0.02 MG tablet Take 1 tablet by mouth daily 84 tablet 4     Multiple Vitamins-Minerals (MULTIVITAMIN ADULT PO)          Allergies: Amoxicillin and Penicillins    ROS:  CONSTITUTIONAL:NEGATIVE for fever, chills, change in weight  RESP:NEGATIVE for significant cough or SOB  BREAST: NEGATIVE for masses, tenderness or discharge  CV: NEGATIVE for chest pain, palpitations or peripheral edema  GI: NEGATIVE for nausea, abdominal pain, heartburn, or change in bowel habits  : normal menstrual cycles  ENDOCRINE: NEGATIVE for temperature intolerance, skin/hair changes    EXAM:  Blood pressure 117/74, height 1.549 m (5' 1\"), weight 83.9 kg (185 lb), not currently breastfeeding.   BMI= Body mass index is " 34.96 kg/m .  General - pleasant female in no acute distress.  Neck - supple without lymphadenopathy or thyromegaly.  Lungs - clear to auscultation bilaterally.  Heart - regular rate and rhythm without murmur.  Breast - no nodularity, asymmetry or nipple discharge bilaterally.  Abdomen - soft, nontender, nondistended, no hepatosplenomegaly.  Pelvic - EG: normal adult female, BUS: within normal limits, Vagina: well rugated, no discharge, Cervix: no lesions or CMT, Uterus: firm, normal sized and nontender, Adnexae: no masses or tenderness.  Rectovaginal - deferred.  Musculoskeletal - no gross deformities.  Neurological - normal strength, sensation, and mental status.      ASSESSMENT/PLAN:  (F41.9) Anxiety  (primary encounter diagnosis)  Comment:   Plan: MENTAL HEALTH REFERRAL  - Adult; Outpatient         Treatment; Individual/Couples/Family/Group         Therapy/Health Psychology; [facility]: [facility] [phone]; We will         contact you to schedule the appointment or         please call with any questions            (Z01.419) Well woman exam with routine gynecological exam  Comment:   Plan: return annually and as needed.  Appointment made to establish care with PCP.     (Z30.09) General counseling for prescription of oral contraceptives  Comment:   Plan: drospirenone-ethinyl estradiol (LORYNA) 3-0.02         MG tablet              Discussed exercise and healthy eating, including calcium intake.  She should return to the clinic in one year, or sooner if problems arise.

## 2020-10-14 NOTE — PROGRESS NOTES
"Subjective     Sandra Sarabia is a 30 year old female who presents to clinic today for the following health issues:    HPI           New Patient/Transfer of Care    Depression and Anxiety Follow-Up    How are you doing with your depression since your last visit? No change    How are you doing with your anxiety since your last visit?  No change    Are you having other symptoms that might be associated with depression or anxiety? Yes:  see phq, irene    Have you had a significant life event? No     Do you have any concerns with your use of alcohol or other drugs? No    -Referred to [facility] by Marisol Jeffery NP on 10/9/20. Pt having increased tearfulness and anxiety. Has adequate support system.  -Has her first appointment on 10/26/20 with Zeinab.  -Never has been on medication for anxiety/depression  -Depression and anxiety started in 2020 d/t current events.   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   -Working from home has been difficult for her.     #Heartburn  -Ongoing for \"a long time\"  -Uses tums without effect.   -Sometimes worse with spicy foods    #Dysuria  -2020, associated with frequency, itchy. UA negative, but was given an abx.   -Intermittent dysuria and frequency since.  -Notes inadequate water intake intermittently.     # TMJ  -Told by dentist she may need a sleep study and she is not sure why.   -Hx of TMJ and grinds her teeth.   -Unsure of snoring    Social History     Tobacco Use     Smoking status: Former Smoker     Packs/day: 0.10     Types: Cigarettes     Quit date: 2018     Years since quittin.7     Smokeless tobacco: Never Used   Substance Use Topics     Alcohol use: Yes     Alcohol/week: 0.0 standard drinks     Comment: occasionally     Drug use: No     PHQ 2019 10/9/2020 10/19/2020   PHQ-9 Total " Score 0 4 2   Q9: Thoughts of better off dead/self-harm past 2 weeks Not at all Not at all Not at all     MARIAJOSE-7 SCORE 10/19/2020   Total Score 3     Last PHQ-9 10/19/2020   1.  Little interest or pleasure in doing things 0   2.  Feeling down, depressed, or hopeless 1   3.  Trouble falling or staying asleep, or sleeping too much 0   4.  Feeling tired or having little energy 1   5.  Poor appetite or overeating 0   6.  Feeling bad about yourself 0   7.  Trouble concentrating 0   8.  Moving slowly or restless 0   Q9: Thoughts of better off dead/self-harm past 2 weeks 0   PHQ-9 Total Score 2   Difficulty at work, home, or with people Somewhat difficult     MARIAJOSE-7  10/19/2020   1. Feeling nervous, anxious, or on edge 1   2. Not being able to stop or control worrying 1   3. Worrying too much about different things 0   4. Trouble relaxing 0   5. Being so restless that it is hard to sit still 0   6. Becoming easily annoyed or irritable 1   7. Feeling afraid, as if something awful might happen 0   MARIAJOSE-7 Total Score 3   If you checked any problems, how difficult have they made it for you to do your work, take care of things at home, or get along with other people? Somewhat difficult           Review of Systems   Constitutional: Negative for appetite change, diaphoresis, fatigue, fever and unexpected weight change.   HENT: Negative.    Eyes: Negative.    Respiratory: Negative for cough, shortness of breath and wheezing.    Cardiovascular: Negative for chest pain and palpitations.   Gastrointestinal: Positive for diarrhea and heartburn. Negative for abdominal pain, hematochezia, nausea and vomiting.   Genitourinary: Positive for dysuria and frequency.        Intermittent urinary symptoms   Musculoskeletal: Negative.    Skin: Negative for rash and wound.   Neurological: Positive for headaches (associated with work/computer use). Negative for dizziness, light-headedness, numbness and paresthesias.   Psychiatric/Behavioral: Positive  "for mood changes. The patient is nervous/anxious.           Objective    /60 (BP Location: Left arm, Patient Position: Chair, Cuff Size: Adult Large)   Pulse 74   Temp 98.2  F (36.8  C) (Tympanic)   Resp 20   Ht 1.626 m (5' 4\")   Wt 86.2 kg (190 lb)   SpO2 98%   BMI 32.61 kg/m    Body mass index is 32.61 kg/m .  Physical Exam  Vitals signs and nursing note reviewed.   Constitutional:       Appearance: Normal appearance.   HENT:      Head: Normocephalic and atraumatic.      Right Ear: Tympanic membrane, ear canal and external ear normal.      Left Ear: Tympanic membrane, ear canal and external ear normal.      Nose: Nose normal.      Mouth/Throat:      Mouth: Mucous membranes are moist.      Pharynx: Oropharynx is clear. Uvula midline. No pharyngeal swelling, oropharyngeal exudate or posterior oropharyngeal erythema.      Tonsils: No tonsillar exudate. 2+ on the right. 2+ on the left.   Eyes:      General:         Right eye: No discharge.         Left eye: No discharge.      Conjunctiva/sclera: Conjunctivae normal.      Pupils: Pupils are equal, round, and reactive to light.   Neck:      Musculoskeletal: Neck supple. No muscular tenderness.      Vascular: No carotid bruit.   Cardiovascular:      Rate and Rhythm: Normal rate and regular rhythm.      Pulses: Normal pulses.      Heart sounds: Normal heart sounds. No murmur.   Pulmonary:      Effort: Pulmonary effort is normal.      Breath sounds: Normal breath sounds. No wheezing or rhonchi.   Abdominal:      General: Abdomen is flat. Bowel sounds are normal. There is no distension.      Palpations: Abdomen is soft. There is no mass.      Tenderness: There is no abdominal tenderness.      Hernia: No hernia is present.   Musculoskeletal: Normal range of motion.   Lymphadenopathy:      Cervical: No cervical adenopathy.   Skin:     General: Skin is dry.   Neurological:      General: No focal deficit present.      Mental Status: She is alert.   Psychiatric:    "      Mood and Affect: Mood normal.         Behavior: Behavior normal.         Thought Content: Thought content normal.               Assessment & Plan     Depression, unspecified depression type/Anxiety  Reported onset at the beginning of pandemic sometime in March 2020. Stressors include her employment and working from home. Has not had episodes in the past or ever been treated for depression/anxiety. Has appointment with [facility] on 10/26/20 with Zeinab. Offered pt option of pharmacological intervention or to try therapy first and pt preferred therapy. Follow up in 3 months.     Gastroesophageal reflux disease, unspecified whether esophagitis present  Ongoing issue without response to tums. Increases occasionally with certain food choices, such as spicy dishes. Ordered pepcid and will reassess effect. Follow up in 3 months.   - famotidine (PEPCID) 20 MG tablet; Take 1 tablet (20 mg) by mouth 2 times daily    TMJ (temporomandibular joint syndrome)  Told by dentist she needs a sleep study, but feels as though she sleeps at night. Hx of TMJ and has habit of grinding teeth. Tonsils are 2+ bilaterally and uvula is midline. No erythema, no obstruction. Pt neck girth is not an issue. She is unsure if she snores. Monitor and follow up in 3 months.     Dysuria  Most likely r/t to her hydration status. Pt admits her water intake has decreased. There is no pruritis or drainage associated. Treated with flagyl and diflucan for dysuria and acute vaginitis 6/25/20 where she had pruritis, dysuria and malodor. Visit was done by telemedicine, therefore no wet prep was done. These symptoms resolved by now has intermittent dysuria and pruritis. Less likely reoccurrence of vaginitis without pruritis or odor. She wears cotton underwear. Increase water intake. Use Dove unscented or Ivory soap. Follow up in 3 months.       Wellness:  STD: Declined  PAP: UTD 8/27/19 negative, HPV negative  Vaccination: influenza, declined.    "  BMI:   Estimated body mass index is 32.61 kg/m  as calculated from the following:    Height as of this encounter: 1.626 m (5' 4\").    Weight as of this encounter: 86.2 kg (190 lb).          Return in about 3 months (around 1/19/2021) for Follow up, lab.    Nakul Olivares  Nurse Practitioner Student  [facility]     I was present with the nurse practitioner student who participated in the service and in the documentation of the note. I have verified the history and personally performed the physical exam and medical decision making. I agree with the assessment and plan of care as documented in the note.       Zee Pastor MD  [facility]    "

## 2020-10-19 ENCOUNTER — OFFICE VISIT (OUTPATIENT)
Dept: FAMILY MEDICINE | Facility: OTHER | Age: 30
End: 2020-10-19
Attending: FAMILY MEDICINE
Payer: COMMERCIAL

## 2020-10-19 VITALS
WEIGHT: 190 LBS | HEART RATE: 74 BPM | TEMPERATURE: 98.2 F | HEIGHT: 64 IN | BODY MASS INDEX: 32.44 KG/M2 | DIASTOLIC BLOOD PRESSURE: 60 MMHG | RESPIRATION RATE: 20 BRPM | SYSTOLIC BLOOD PRESSURE: 110 MMHG | OXYGEN SATURATION: 98 %

## 2020-10-19 DIAGNOSIS — R30.0 DYSURIA: ICD-10-CM

## 2020-10-19 DIAGNOSIS — F41.9 ANXIETY: ICD-10-CM

## 2020-10-19 DIAGNOSIS — F32.A DEPRESSION, UNSPECIFIED DEPRESSION TYPE: Primary | ICD-10-CM

## 2020-10-19 DIAGNOSIS — K21.9 GASTROESOPHAGEAL REFLUX DISEASE, UNSPECIFIED WHETHER ESOPHAGITIS PRESENT: ICD-10-CM

## 2020-10-19 DIAGNOSIS — M26.609 TMJ (TEMPOROMANDIBULAR JOINT SYNDROME): ICD-10-CM

## 2020-10-19 PROCEDURE — 99214 OFFICE O/P EST MOD 30 MIN: CPT | Performed by: FAMILY MEDICINE

## 2020-10-19 RX ORDER — FAMOTIDINE 20 MG/1
20 TABLET, FILM COATED ORAL 2 TIMES DAILY
Qty: 180 TABLET | Refills: 1 | Status: SHIPPED | OUTPATIENT
Start: 2020-10-19 | End: 2021-11-02

## 2020-10-19 ASSESSMENT — ANXIETY QUESTIONNAIRES
5. BEING SO RESTLESS THAT IT IS HARD TO SIT STILL: NOT AT ALL
6. BECOMING EASILY ANNOYED OR IRRITABLE: SEVERAL DAYS
7. FEELING AFRAID AS IF SOMETHING AWFUL MIGHT HAPPEN: NOT AT ALL
2. NOT BEING ABLE TO STOP OR CONTROL WORRYING: SEVERAL DAYS
IF YOU CHECKED OFF ANY PROBLEMS ON THIS QUESTIONNAIRE, HOW DIFFICULT HAVE THESE PROBLEMS MADE IT FOR YOU TO DO YOUR WORK, TAKE CARE OF THINGS AT HOME, OR GET ALONG WITH OTHER PEOPLE: SOMEWHAT DIFFICULT
3. WORRYING TOO MUCH ABOUT DIFFERENT THINGS: NOT AT ALL
GAD7 TOTAL SCORE: 3
1. FEELING NERVOUS, ANXIOUS, OR ON EDGE: SEVERAL DAYS

## 2020-10-19 ASSESSMENT — ENCOUNTER SYMPTOMS
COUGH: 0
DIZZINESS: 0
DIARRHEA: 1
ABDOMINAL PAIN: 0
NUMBNESS: 0
WHEEZING: 0
EYES NEGATIVE: 1
HEARTBURN: 1
APPETITE CHANGE: 0
FATIGUE: 0
HEADACHES: 1
DYSURIA: 1
LIGHT-HEADEDNESS: 0
FREQUENCY: 1
DIAPHORESIS: 0
MUSCULOSKELETAL NEGATIVE: 1
PARESTHESIAS: 0
SHORTNESS OF BREATH: 0
VOMITING: 0
NERVOUS/ANXIOUS: 1
FEVER: 0
UNEXPECTED WEIGHT CHANGE: 0
NAUSEA: 0
PALPITATIONS: 0
WOUND: 0
HEMATOCHEZIA: 0

## 2020-10-19 ASSESSMENT — PAIN SCALES - GENERAL: PAINLEVEL: NO PAIN (0)

## 2020-10-19 ASSESSMENT — MIFFLIN-ST. JEOR: SCORE: 1556.83

## 2020-10-19 ASSESSMENT — PATIENT HEALTH QUESTIONNAIRE - PHQ9
5. POOR APPETITE OR OVEREATING: NOT AT ALL
SUM OF ALL RESPONSES TO PHQ QUESTIONS 1-9: 2

## 2020-10-19 NOTE — NURSING NOTE
"Chief Complaint   Patient presents with     Establish Care       Initial /60 (BP Location: Left arm, Patient Position: Chair, Cuff Size: Adult Large)   Pulse 74   Temp 98.2  F (36.8  C) (Tympanic)   Resp 20   Ht 1.626 m (5' 4\")   Wt 86.2 kg (190 lb)   SpO2 98%   BMI 32.61 kg/m   Estimated body mass index is 32.61 kg/m  as calculated from the following:    Height as of this encounter: 1.626 m (5' 4\").    Weight as of this encounter: 86.2 kg (190 lb).  Medication Reconciliation: complete  Raven Jerome LPN    "

## 2020-10-20 ASSESSMENT — ANXIETY QUESTIONNAIRES: GAD7 TOTAL SCORE: 3

## 2020-10-26 ENCOUNTER — OFFICE VISIT (OUTPATIENT)
Dept: PSYCHOLOGY | Facility: OTHER | Age: 30
End: 2020-10-26
Attending: COUNSELOR
Payer: COMMERCIAL

## 2020-10-26 DIAGNOSIS — F43.23 ADJUSTMENT DISORDER WITH MIXED ANXIETY AND DEPRESSED MOOD: Primary | ICD-10-CM

## 2020-10-26 PROCEDURE — 90791 PSYCH DIAGNOSTIC EVALUATION: CPT | Performed by: COUNSELOR

## 2020-10-26 ASSESSMENT — ANXIETY QUESTIONNAIRES
IF YOU CHECKED OFF ANY PROBLEMS ON THIS QUESTIONNAIRE, HOW DIFFICULT HAVE THESE PROBLEMS MADE IT FOR YOU TO DO YOUR WORK, TAKE CARE OF THINGS AT HOME, OR GET ALONG WITH OTHER PEOPLE: SOMEWHAT DIFFICULT
7. FEELING AFRAID AS IF SOMETHING AWFUL MIGHT HAPPEN: MORE THAN HALF THE DAYS
2. NOT BEING ABLE TO STOP OR CONTROL WORRYING: MORE THAN HALF THE DAYS
3. WORRYING TOO MUCH ABOUT DIFFERENT THINGS: MORE THAN HALF THE DAYS
GAD7 TOTAL SCORE: 12
1. FEELING NERVOUS, ANXIOUS, OR ON EDGE: MORE THAN HALF THE DAYS
6. BECOMING EASILY ANNOYED OR IRRITABLE: NEARLY EVERY DAY
5. BEING SO RESTLESS THAT IT IS HARD TO SIT STILL: NOT AT ALL

## 2020-10-26 ASSESSMENT — PATIENT HEALTH QUESTIONNAIRE - PHQ9
SUM OF ALL RESPONSES TO PHQ QUESTIONS 1-9: 5
5. POOR APPETITE OR OVEREATING: SEVERAL DAYS

## 2020-10-26 NOTE — PROGRESS NOTES
Diagnostic Assessment       [facility]  Behavioral Health Diagnostic Assessment    2020      Patient Name: Sandra Sarabia    Patient: Sandra Sarabia MRN: [id number] ACCOUNT NUMBER: [id number]  : 1990   Age: 30 year old   Sex: female     Phone:  Home number on file: [phone] (home)    Work number on file:  There is no work phone number on file.    Cell number on file:       Telephone Information:   Mobile [phone]          Service Type: Individual        Session Start Time:  3:00  Session End Time: 4:10   Session Length: 70 minute   Attendees: Client      May leave program related message?  Yes  Preferred Phone: Cell    Yes, the patient has been informed that any other mental health professional providing mental health services to me will need access to this Diagnostic Assessment in order to develop a treatment plan and receive payment.         Identifying Information:  aSndra Sarabia is a 30 year old, White, single female. Sandra attended the   alone.  Patient presented as anxious, fidgety and tearful at times. She is cooperative and articulate about her current struggles and reason for this visit.     Reason for Referral: Sandra was referred to Behavioral Health Services by self and Dr. Zee Pastor at [facility]. Sandra reports the reason for referral is to establish with a therapist for ongoing therapy services to address current anxious and depressed mood. Further information for this assessment was obtained via [place] medical and behavioral health records.    Patient's Statement of Presenting Concern & Functional impairments:    Sandra states her reason for making this appointment is that she feels like she s just been really sad and doesn t fully understand why. She has not had a significant history of issues with anxiety or depression although she states she thinks she is an anxious person but has been able to cope.  As a result of depressive  and anxious symptoms, Sandra has been somewhat more withdrawn and stressed. She has had more difficulty coping and with daily life functioning. Sandra has maintained employment, gets together with family on a regular (almost daily) basis and takes care of herself and pets. She reports not feeling like herself, being much more irritable, emotional and tearful most every day.    Current Stressors/Losses/Disappointments: The COVID Pandemic, Working from home, doing a stressful job with less support from colleges, spending the majority of her time at home    Mental Health History:  Sandra reports first onset of mental health symptoms in about March of this year when she started working from home due to the COVID 19 Pandemic. Sandra has had not previous mental health diagnosis or mental health services. She recently established a PCP in order to also get a referral for mental health counseling. She reports feeling worried about embarking on this process and did not fully know what to expect. Sandra reports no history of psychiatric hospitalizations or of civil commitment.    Onset/Duration/Pattern of Symptoms noted above:   As stated above, symptoms of depression and anxiety began to cause Sandra difficulty around March or April of this year (2020). She states that she is aware that she is probably an anxious person by nature but has been able to manage with limited impact on her daily life functioning. In recent months she has felt it impacting her quality of life. She is tearful as she explains being more irritable and snapping at her dogs and people she loves.     Personal Safety:    Are you depressed or being treated for depression? No history of depression but currently experiencing depressive symptoms   Have you ever thought about hurting yourself (SIB) now or in the past? No     Have you ever thought about suicide now or in the past? No     Do you have a gun, weapons or other means (including medications) to harm  yourself available to you? No   Have any of your family members or friends attempted or completed suicide? (If yes, Who, When, How) No     Do you take chances with your safety?   no   Have you currently or in the past had trouble with physical aggression (If yes, describe)? no     Have you ever thought about killing someone else? No   Have you ever heard voices? No       Supports:   From whom do you receive support? (family/friends/agency) family and friend(s)     How often do you have contact with them? daily     Do your support people want/need education/resources? No        Is there anything in your life (current or history) that is satisfying to you (include leisure interests/hobbies)?   yes      Hope/Belief System:  Do you think things can get better? yes     Rate how strongly you believe things can get better:   (Scale 1-5; 1=no belief; 5=Very Strong Belief)      3   What would make it better?  More control of emotions    What gives you hope?    That things were better before, new job starting in January       Personal Safety Summary:  After gathering the above information, Sandra  presents the following high risk factors for suicide: none.  Sandra denies current fears or concerns for personal safety.    Sandra has the following Protective Factors: Positive coping skills, Positive problem-solving skills and Positive social support     Upon review of the patient interview and identification of high risk factors determine individualized safety strategies alternatives and treatment plan interventions. No noted risks for suicide. Sandra denies any suicidal thinking, intent, or plan.     Chemical Health History:     Family History: Sandra reports a alcoholism on her paternal side. She states that he dad is likely an alcoholic. She has never seen him drink. He quit drinking before she was born.    Substance Use / Treatment:  Sandra has no history of CD treatment or any legal issues as a result of chemical  use.  Sandra reports using alcohol on occasion and has not issues with use. She reports no use of marijuana or other drugs. When prescribed medications she takes them as prescribed by her doctor.   There was no indication for a CAGE-Aid or other alcohol or drug assessment today.    Legal History:    Sandra reports that she has not been involved with the legal system.    Life Situation (Employment/School/Finances/Basic Needs):  Sandra  is currently living in a home she owns in [place]. She lives alone with her two dogs. Sandra recently got a new puppy that keeps her busy. Sandra describes this as a stable living environment. She states that she has everything she needs but that money can be a stressor at times. Sandra does describe feeling lonely at times now that she is working from home. Prior to remote working, she spent all day around other people and then enjoyed spending evenings to herself. Now she spends the majority of her time by herself. She did state that she can feel isolated. Sandra does have a close relationship with her mom and brother. She states they come over for dinner most evenings.    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 Sandra reports this to be both rewarding and stressful. She states having to work remotely and not be able to go to client's houses or be in the office where she can bounce ideas of co-workers, increases the stress or this already stressful job.    Sandra says her mother is a teacher and education was always important in their family. She did well in school. She graduated from high school, went to [employer] for her AA and then to [employer], where she earned a double major in Criminology and Sociology.     Sandra denies a history of gambling or gambling treatment. Sandra denies concerns regarding her current ability to meet basic needs.    Social/Family History:  Sandra   reports she grew up in [place] with her mother, father, and older brother. She is the youngest of two siblings. Carries parents  when she was 15. She says she was very close with her dad and now she feels like she is not as close. She says it is hard for her still to have them . She reports holidays being especially difficult and the she feels like she has to make sure her mom is okay. Sandra says at time her mom will make little comments about her dad and this tends to bother her. Her dad lives in [place] and she does see him but has seen him less in the past 8 months, since COVID.    Sandra is currently single. She did not share about any romantic relationships. Sandra identifies her sexual orientation as straight. She denies any sexual health concerns. Sandra does not have any children. She shares that she has two dogs. Sandra denies personal  experience.     Sandra reports no family mental health issues or diagnosis that she is aware of. She did state that her mom has had difficulty with getting and being .    Significant Losses / Trauma / Abuse / Neglect Issues / Developmental Incidents:  Sandra reports having had a difficult time with the divorce of her parents. She was 15 when they  and this was unexpected and difficult because she only every knew her parents together.  Sandra shares that she has a cousin who  young of alcoholism.   Sandra was extremely close with her maternal grandparents. She reports mostly living with them from 6 - 8th grade becausethey lived in [place] near her school so it was convenient and it helped for them to have someone there with them. This grandmother  of cancer in . From the time she was diagnosed until she passed, was a short time.   Sandra also reports that her aunt  last winter in a house fire. This was her mothers sister. She had been close to her at one time but not as much after her grandmother . Sandra  has not addressed the above concerns in therapy.    Restoration Preference/Spiritual Beliefs/Cultural Considerations: Sandra reports being Muslim and going to Orthodoxy on occasion.     A. Ethnic Self-Identification:  Sandra self-identifies her race/ethnicities as:  and her preferred language to be English.   Sandra reports she does not need the assistance of an . Sandra  reports she does not need other support or modifications involved in therapy.     Strengths/Vulnerabilities:   Sandra identifies her personal strengths as being a dedicated and hard worker, caring about other people, being willing to help and be there for others. Sandra enjoys being outdoors, going for walks. She likes animals and has two dogs. She also enjoys spending time with family and friends. She says she likes to cook and also doing projects around the house.    Sandra states that at time she may bee too caring and could be taken advantage of. She knows she has a difficult time saying no. Sandra recognizes that her dad leaving was really difficult for her and she can have a hard time trusting others. Sandra states that she worries about not being included and her feelings can be hurt easily.    Medical History / Physical Health Screen:     Primary Care Physician: Sandra has a [facility] Primary Care Provider, who is named Zee Pastor..   Last Physical Exam: within the past year. Client was encouraged to follow up with PCP if symptoms were to develop.    Mental Health Medication Management Provider / Psychiatrist: Sandra reports not having a psychiatrist.      Current medications including prescription, non-prescription, herbals, dietary aids and vitamins:  Per client report:     Current Outpatient Medications:      Ascorbic Acid (VITAMIN C PO), , Disp: , Rfl:      drospirenone-ethinyl estradiol (LORYNA) 3-0.02 MG tablet, Take 1 tablet by mouth daily, Disp: 84 tablet, Rfl: 4     famotidine (PEPCID) 20 MG tablet, Take  1 tablet (20 mg) by mouth 2 times daily, Disp: 180 tablet, Rfl: 1     Multiple Vitamins-Minerals (MULTIVITAMIN ADULT PO), , Disp: , Rfl:     Sandra reports current medications are: No Current prescriptions.   Sandra describes taking her medications as: Independent.  Sandra reports not having any current medications..        Medical:  No past medical history on file.    Surgical:  Past Surgical History:   Procedure Laterality Date     SOFT TISSUE SURGERY      right inner thigh I and D     Allergy:   Sandra reports   Allergies   Allergen Reactions     Amoxicillin Hives     Penicillins Hives        Family History of Medical, Mental Health and/or Substance Use problems:  Per client report:   Family History   Problem Relation Age of Onset     Hypertension Mother      Myocardial Infarction Father      Coronary Artery Disease Father      Coronary Artery Disease Maternal Grandmother      Diabetes Paternal Grandfather      Hyperlipidemia No family hx of      Asthma No family hx of      Thyroid Disease No family hx of      Colon Cancer No family hx of      Breast Cancer No family hx of        Sandra reports no current medical concerns.      General Health:  Have you had any exposure to any communicable disease in the past 2-3 weeks? no     Are you aware of safe sex practices? yes     Is there a possibility of pregnancy?  no       Nutrition:    Are you on a special diet? If yes, please explain:  no   Do you have any concerns regarding your nutritional status? If yes, please explain:  no   Have you had any appetite changes in the last 3 months?  No     Have you had any weight loss or weight gain in the last 3 months?  No     Do you have a history of an eating disorder? no   Do you have a history of being in an eating disorder program? no     Fall Risk:   Have you had any falls in the past 3 months? no     Do you currently useany assistive devices for mobility?   no     Per completion of the Medical History / Physical Health  Screen, is there a recommendation to see / follow up with a primary care physician/clinic?    No.    Clinical Findings    Mental Status Assessment:    Appearance:   Appropriate   Eye Contact:   Good   Psychomotor Behavior: Normal  Restless   Attitude:   Cooperative  Pleasant  Orientation:   All  Speech   Rate / Production: Normal    Volume:  Normal   Mood:    Anxious  Sad   Affect:    Appropriate  Tearful  Thought Content:  Clear   Thought Form:  Coherent  Logical   Insight:    Good       Review of Symptoms:  Depression: Interest Hopeless Helpless Ruminations Irritability Sad Tearful  Alley:  No symptoms  Psychosis: No symptoms  Anxiety: Worries Nervousness  Panic:  Shortness of Breath Tightness in her chest Fearful  Post Traumatic Stress Disorder: No symptoms  Obsessive Compulsive Disorder: No symptoms  Eating Disorder: No symptoms    Safety Issues and Plan for Safety and Risk Management:  Patient denies a history of suicidal ideation, suicide attempts, self-injurious behavior, homicidal ideation, homicidal behavior and and other safety concerns  Patient denies current fears or concerns for personal safety.  Patient denies current or recent suicidal ideation or behaviors.  Patient denies current or recent homicidal ideation or behaviors.  Patient denies current or recent self injurious behavior or ideation.  Patient denies other safety concerns.  Patient reports there are no firearms in the house  Recommended that patient call 911 or go to the local ED should there be a change in any of these risk factors.      Diagnostic Criteria:  Adjustment Disorder with mixed anxiety and depressed mood  A. The development of emotional or behavioral symptoms in response to an identifiable stressor(s) occurring within 3 months of the onset of the stressor(s)  B. These symptoms or behaviors are clinically significant, as evidenced by one or both of the following:       - Marked distress that is out of proportion to the  severity/intensity of the stressor (with consideration for external context & culture)  C. The stress-related disturbance does not meet criteria for another disorder & is not not an exacerbation of another mental disorder  D. The symptoms do not represent normal bereavement  E. Once the stressor or its consequences have terminated, the symptoms do not persist for more than an additional 6 months       * Adjustment Disorder with Mixed Anxiety and Depressed Mood: The predominant manifestation is a combination of depression and anxiety    DSM5 Diagnoses: (Sustained by DSM5 Criteria Listed Above)  Behavioral and Medical Diagnosis: Adjustment Disorders  309.28 (F43.23) With mixed anxiety and depressed mood;  moderate  Psychosocial & Contextual Factors: family of origin issues, mental health symptoms and occupational / vocational stress    The patient  MAY utilize the Alpha Stimulator therapy.   Patient Does not have a pacemaker.     Medical Concerns that may Impact Treatment:   None noted today    WHODAS 2.0 SCORE: No flowsheet data found.    LOCUS: Not completed today    PHQ-9:   PHQ-9 SCORE 10/9/2020 10/19/2020 10/26/2020   PHQ-9 Total Score 4 2 5         Clinical Findings/Summary: Sandra reports to this initial therapy session to establish counseling services. She is currently experiencing symptoms of anxiety and depression that are impacting her daily life. Sandra's symptoms of depression include being tearful, crying for what seems like no reason, being more emotional than normal, feeling down or depressed, feeling tired or less energy. She experiences symptoms of anxiety that include; worrying, anxious/nervous feelings and irritability. Sandra describes having panic symptoms one time approximately 1 -2 months ago. She felt tightness in her chest along with high anxiety and fear. This last most of one evening. She was able to go to sleep and woke up feeling better. The above symptoms seem to be a result in daily  life and work changes as a result of the Covid-19 Pandemic. Sandra does note that she feels she is likely an anxious person however symptoms have been more manageable and now are having more of an impact on her overall wellbeing and daily functioning. This clinician is recommending weekly therapy to address current symptomology. A treatment plan will be developed during the next therapy session, which will implement therapeutic interventions aimed at reduction of above stated adverse symptoms.    I certify that Behavioral Health services are medically necessary to improve or maintain the client's condition and functional level and to prevent relapse or hospitalization.  Behavioral health services will be provided in lieu of psychiatric hospitalization, no less intensive level of care would be sufficient to provide the medically necessary treatment the client requires.     Due to the clinical severity of the symptoms reported by the client, functional impairments exist that significantly disrupt functioning.  The client reports these symptoms negatively impact their quality of life.  Without the recommended medically necessary treatments listed, the client's symptoms are likely to increase in severity and functioning may further decline.  If the client participates and complies with recommended treatment, the prognosis if fair.      Yes, the patient has been informed that any other mental health professional providing mental health services to me will need access to this Diagnostic Assessment in order to develop a treatment plan and receive payment.    Argelia Bowen, Psychiatric

## 2020-10-27 ASSESSMENT — ANXIETY QUESTIONNAIRES: GAD7 TOTAL SCORE: 12

## 2020-11-11 ENCOUNTER — OFFICE VISIT (OUTPATIENT)
Dept: PSYCHOLOGY | Facility: OTHER | Age: 30
End: 2020-11-11
Attending: COUNSELOR
Payer: COMMERCIAL

## 2020-11-11 DIAGNOSIS — F43.23 ADJUSTMENT DISORDER WITH MIXED ANXIETY AND DEPRESSED MOOD: Primary | ICD-10-CM

## 2020-11-11 PROCEDURE — 90837 PSYTX W PT 60 MINUTES: CPT | Performed by: COUNSELOR

## 2020-11-11 ASSESSMENT — PATIENT HEALTH QUESTIONNAIRE - PHQ9
5. POOR APPETITE OR OVEREATING: NOT AT ALL
SUM OF ALL RESPONSES TO PHQ QUESTIONS 1-9: 1

## 2020-11-11 ASSESSMENT — ANXIETY QUESTIONNAIRES
1. FEELING NERVOUS, ANXIOUS, OR ON EDGE: SEVERAL DAYS
3. WORRYING TOO MUCH ABOUT DIFFERENT THINGS: SEVERAL DAYS
6. BECOMING EASILY ANNOYED OR IRRITABLE: SEVERAL DAYS
7. FEELING AFRAID AS IF SOMETHING AWFUL MIGHT HAPPEN: NOT AT ALL
GAD7 TOTAL SCORE: 3
IF YOU CHECKED OFF ANY PROBLEMS ON THIS QUESTIONNAIRE, HOW DIFFICULT HAVE THESE PROBLEMS MADE IT FOR YOU TO DO YOUR WORK, TAKE CARE OF THINGS AT HOME, OR GET ALONG WITH OTHER PEOPLE: SOMEWHAT DIFFICULT
2. NOT BEING ABLE TO STOP OR CONTROL WORRYING: NOT AT ALL
5. BEING SO RESTLESS THAT IT IS HARD TO SIT STILL: NOT AT ALL

## 2020-11-11 NOTE — PROGRESS NOTES
Counseling Progress Note    Client Name: aSndra Sarabia    Date of Service (when I saw the patient): 11/11/2020    Service Type: Individual Therapy    Session Start Time: 11:00   Session End Time: 11:55  Session Length: I spent 53+ minutes performing psychotherapy during this office visit.  Session Number: 2    DSM5 Diagnoses: Adjustment Disorders  309.28 (F43.23) With mixed anxiety and depressed mood;  moderate    Attendees: Clinet     Health Maintenance Topics with due status: Overdue       Topic Date Due    DEPRESSION ACTION PLAN 1990       Treatment Plan Due Date: 11/23/2020 - review and sign at this next session  Diagnostic Assessment Update Date Due: 10/25/2021    DATA    Treatment Objective(s) Addressed in This Session: Build coping skills, reduce symptoms of anxiety and depression    Progress on / Status of Treatment Objective(s) / Homework: Reports reduction in anxiety and depression. Expressed looking forward to her new position at [employer]. Has been busy with puppy training. Sandra was able to talk about some of her fears when her dad had a heart attack. She also identified that she has seemed to become more anxious of the past couple years. This does line up with the time that her dad had a heart attack but she is unsure how related this is.     Intervention: Discussion about current strategies for coping with depression and anxiety. Talked more about symptomology and possible triggers. Discussed treatment plan goals and objectives.      90-day Treatment Plan review completed: Not applicable, wrote it during this session, will review during next session.    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 and Client denies other safety concerns.  A safety and risk management plan has not been developed at this time; however client was given the after-hours number should there be a change in any of these risk factors.    Appearance: awake, alert and well groomed  Eye Contact: good  Psychomotor Behavior: Normal  Attitude: cooperative  Orientation: time, person, and place  Speech: clear, coherent  Rate / Production: Normal  Volume: Normal  Mood: relaxed and sad at times  Affect: mood congruent  Thought Content: no auditory hallucinations present and no visual hallucinations present  Thought Form: Logical, Linear and Goal directed  Insight: good    Collateral Reports Completed: None    PLAN: Weekly or bi-weekly individual therapy sessions as available. Complete treatment plan and review & sign in next session. Sandra plans to look into classes offered through [facility] for stress reduction / compassion fatigue. She also plans to invite her brother to do something - possibly outdoors. Check-in with her on how these things went between sessions.

## 2020-11-12 ASSESSMENT — ANXIETY QUESTIONNAIRES: GAD7 TOTAL SCORE: 3

## 2020-11-23 ENCOUNTER — OFFICE VISIT (OUTPATIENT)
Dept: PSYCHOLOGY | Facility: OTHER | Age: 30
End: 2020-11-23
Attending: COUNSELOR
Payer: COMMERCIAL

## 2020-11-23 DIAGNOSIS — F43.23 ADJUSTMENT DISORDER WITH MIXED ANXIETY AND DEPRESSED MOOD: Primary | ICD-10-CM

## 2020-11-23 PROCEDURE — 90837 PSYTX W PT 60 MINUTES: CPT | Performed by: COUNSELOR

## 2020-11-23 ASSESSMENT — ANXIETY QUESTIONNAIRES
5. BEING SO RESTLESS THAT IT IS HARD TO SIT STILL: NOT AT ALL
3. WORRYING TOO MUCH ABOUT DIFFERENT THINGS: SEVERAL DAYS
IF YOU CHECKED OFF ANY PROBLEMS ON THIS QUESTIONNAIRE, HOW DIFFICULT HAVE THESE PROBLEMS MADE IT FOR YOU TO DO YOUR WORK, TAKE CARE OF THINGS AT HOME, OR GET ALONG WITH OTHER PEOPLE: SOMEWHAT DIFFICULT
7. FEELING AFRAID AS IF SOMETHING AWFUL MIGHT HAPPEN: SEVERAL DAYS
1. FEELING NERVOUS, ANXIOUS, OR ON EDGE: SEVERAL DAYS
GAD7 TOTAL SCORE: 6
2. NOT BEING ABLE TO STOP OR CONTROL WORRYING: SEVERAL DAYS
6. BECOMING EASILY ANNOYED OR IRRITABLE: SEVERAL DAYS

## 2020-11-23 ASSESSMENT — PATIENT HEALTH QUESTIONNAIRE - PHQ9
SUM OF ALL RESPONSES TO PHQ QUESTIONS 1-9: 2
5. POOR APPETITE OR OVEREATING: SEVERAL DAYS

## 2020-11-23 NOTE — PROGRESS NOTES
(The author of this note documented a reason for not sharing it with the patient.      Counseling Progress Note    Client Name: Sandra Sarabia    Date of Service (when I saw the patient): 11/23/2020    Service Type: Individual Therapy    Session Start Time: 3:00   Session End Time: 3:55  Session Length: I spent 53+ minutes performing psychotherapy during this office visit.  Session Number: 3    DSM5 Diagnoses: Adjustment Disorders  309.28 (F43.23) With mixed anxiety and depressed mood;  moderate    Attendees: Clinet     Health Maintenance Topics with due status: Overdue       Topic Date Due    DEPRESSION ACTION PLAN 1990       Treatment Plan Due Date: 02/19/2021  Diagnostic Assessment Update Date Due: 10/25/2021    DATA    Treatment Objective(s) Addressed in This Session: Build coping skills, reduce symptoms of anxiety and depression. Reviewed and agreed upon treatment plan goals and objectives.    Progress on / Status of Treatment Objective(s) / Homework: Work has been stressful moving towards a transition into her new position. Sandra signed up and took a class through work on compassion fatigue. She said she ended up multi-tasking during the training because she is so busy but she hopes to take other similar training once she is set in her new position. She says her new supervisor is supportive of training. Sandra also hung out with her brother two times over the past week - as was discussed at her last session. This was enjoyable. She also noticed putting off going to the grocery store and pushed herself to go by changing the should to want to. After she went she felt better.     Intervention: Active listening, validation. Continuing to build therapeutic rapport. Discussion about current strategies for coping with depression and anxiety. Talked more about symptomology and possible triggers. Reviewed and agreed upon treatment plan goals and objectives.      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 and Client denies other safety concerns.  A safety and risk management plan has not been developed at this time; however client was given the after-hours number should there be a change in any of these risk factors.    Appearance: awake, alert and well groomed  Eye Contact: good  Psychomotor Behavior: Normal  Attitude: cooperative  Orientation: time, person, and place  Speech: clear, coherent  Rate / Production: Normal  Volume: Normal  Mood: relaxed and sad at times  Affect: mood congruent  Thought Content: no auditory hallucinations present and no visual hallucinations present  Thought Form: Logical, Linear and Goal directed  Insight: good    Collateral Reports Completed: None    PLAN: Weekly or bi-weekly individual therapy sessions as available. Sandra is going to try a different nightly activity besides watching television to see if there is something that can be more relaxing and the end of the evening. She also plans to do at least 2 activities of enjoyment this week. Print and sign treatment plan (review date is 11/23, but there was not printed copy).

## 2020-11-23 NOTE — PROGRESS NOTES
"Behavioral Health Treatment Plan      Client's Name: Sandra Sarabia  YOB: 1990    Date: 11/23/2020    DSM-V Diagnoses: Adjustment Disorders  309.28 (F43.23) With mixed anxiety and depressed mood  Psychosocial / Contextual Factors: Vocational Stressors, Pandemic, Family of origin stressors    Referral / Collaboration:  Will collaborate with care team as indicated during treatment.    Anticipated number of session or this episode of care: 26    MeasurableTreatment Goal(s) related to diagnosis / functional impairment(s)  Goal 1: Sandra will report a reduction in depressive and anxious symptoms.    Objective A: Sandra will report using healthy coping strategies to manage anxious feelings.          Status: New - Date(s): 11/23/2020    Objective B:  Sandra will develop healthy cognitive patterns and beliefs and will increase ability to function adaptively and feel more life satisfaction.       Status: New - Date(s): 11/23/2020     Objective C: Sandra will, reportedly, engage in at least two activities of enjoyment weekly.                  Status: New - Date(s): 11/23/2020     Objective D: Sandra will identify when she is using \"what if\" thinking or other worrying and use coping skills, to either stop or change this type of thinking.       Possible Therapeutic Intervention(s)  Psycho-education regarding mental health diagnoses and treatment options    Skills training    Explore skills useful to client in current situation.    Skills include assertiveness, communication, conflict management, problem-solving, relaxation, etc.    Solution-Focused Therapy    Explore patterns in patient's relationships and discuss options for new behaviors.    Explore patterns in patient's actions and choices and discuss options for new behaviors.    Cognitive-behavioral Therapy    Discuss common cognitive distortions, identify them in patient's life.    Explore ways to challenge, replace, and act against these " cognitions.    Acceptance and Commitment Therapy    Explore and identify important values in patient's life.    Discuss ways to commit to behavioral activation around these values.    Psychodynamic psychotherapy    Discuss patient's emotional dynamics and issues and how they impact behaviors.    Explore patient's history of relationships and how they impact present behaviors.    Explore how to work with and make changes in these schemas and patterns.    Narrative Therapy    Explore the patient's story of his/her life from his/her perspective.    Explore alternate ways of understanding their experience, identifying exceptions, developing new themes.    Interpersonal Psychotherapy    Explore patterns in relationships that are effective or ineffective at helping patient reach their goals, find satisfying experience.    Discuss new patterns or behaviors to engage in for improved social functioning.    Behavioral Activation    Discuss steps patient can take to become more involved in meaningful activity.    Identify barriers to these activities and explore possible solutions.    Mindfulness-Based Strategies    Discuss skills based on development and application of mindfulness.    Skills drawn from compassion-focused therapy, dialectical behavior therapy, mindfulness-based stress reduction, mindfulness-based cognitive therapy, etc.      We have developed these goals together during our work to this point. Patient has assisted in the development of these goals and has agreed to this treatment plan.        Sandra Sarabia   November 23, 2020     Acknowledgement of Current Treatment Plan       I have reviewed my treatment plan with my therapist / counselor on ______________. I agree with the plan as it is written in the electronic health record.    Name Signature   Sandra Sarabia      Name of Therapist / Counselor

## 2020-11-24 ASSESSMENT — ANXIETY QUESTIONNAIRES: GAD7 TOTAL SCORE: 6

## 2020-11-30 ENCOUNTER — OFFICE VISIT (OUTPATIENT)
Dept: PSYCHOLOGY | Facility: OTHER | Age: 30
End: 2020-11-30
Attending: COUNSELOR
Payer: COMMERCIAL

## 2020-11-30 DIAGNOSIS — F43.23 ADJUSTMENT DISORDER WITH MIXED ANXIETY AND DEPRESSED MOOD: Primary | ICD-10-CM

## 2020-11-30 PROCEDURE — 90837 PSYTX W PT 60 MINUTES: CPT | Performed by: COUNSELOR

## 2020-11-30 ASSESSMENT — PATIENT HEALTH QUESTIONNAIRE - PHQ9
5. POOR APPETITE OR OVEREATING: NOT AT ALL
SUM OF ALL RESPONSES TO PHQ QUESTIONS 1-9: 1

## 2020-11-30 ASSESSMENT — ANXIETY QUESTIONNAIRES
6. BECOMING EASILY ANNOYED OR IRRITABLE: SEVERAL DAYS
2. NOT BEING ABLE TO STOP OR CONTROL WORRYING: NOT AT ALL
GAD7 TOTAL SCORE: 3
IF YOU CHECKED OFF ANY PROBLEMS ON THIS QUESTIONNAIRE, HOW DIFFICULT HAVE THESE PROBLEMS MADE IT FOR YOU TO DO YOUR WORK, TAKE CARE OF THINGS AT HOME, OR GET ALONG WITH OTHER PEOPLE: SOMEWHAT DIFFICULT
7. FEELING AFRAID AS IF SOMETHING AWFUL MIGHT HAPPEN: NOT AT ALL
3. WORRYING TOO MUCH ABOUT DIFFERENT THINGS: SEVERAL DAYS
5. BEING SO RESTLESS THAT IT IS HARD TO SIT STILL: NOT AT ALL
1. FEELING NERVOUS, ANXIOUS, OR ON EDGE: SEVERAL DAYS

## 2020-11-30 NOTE — PROGRESS NOTES
Counseling Progress Note    Client Name: Sandra Sarabia    Date of Service (when I saw the patient): 11/30/2020    Service Type: Individual Therapy    Session Start Time: 3:00   Session End Time: 3:55  Session Length: I spent 53+ minutes performing psychotherapy during this office visit.  Session Number: 4    DSM5 Diagnoses: Adjustment Disorders  309.28 (F43.23) With mixed anxiety and depressed mood;  moderate    Attendees: Clinet     Health Maintenance Topics with due status: Overdue       Topic Date Due    DEPRESSION ACTION PLAN 1990       Treatment Plan Due Date: 02/19/2021  Diagnostic Assessment Update Date Due: 10/25/2021    DATA    Treatment Objective(s) Addressed in This Session: Build coping skills, reduce symptoms of anxiety and depression.    Progress on / Status of Treatment Objective(s) / Homework: Sandra reports feeling much less anxious over the past week. She attributes some of this to being a holiday week and fewer work days. She did at least two activities of enjoyment over the past two weeks. Sandra also had a couple evenings where she spent them decorating and watching less television. She states that she felt less anxiety and less time in her head.     Intervention: Active listening, validation. Continuing to build therapeutic rapport. Discussion about paying attention to what is working when things are going well in order to experience more of the same. Discussed coping strategies. Taught and practiced relaxation breathing.      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 and Client denies other safety concerns.  A safety and risk management plan has not been developed at this time; however client was given the after-hours number should there be a change in any of these risk factors.    Appearance: awake, alert and well groomed  Eye Contact: good  Psychomotor Behavior: Normal  Attitude: cooperative  Orientation: time, person, and place  Speech: clear, coherent  Rate / Production: Normal  Volume: Normal  Mood: relaxed and sad at times  Affect: mood congruent  Thought Content: no auditory hallucinations present and no visual hallucinations present  Thought Form: Logical, Linear and Goal directed  Insight:    Collateral Reports Completed: None    PLAN: Weekly or bi-weekly individual therapy sessions as available. Continue working on treatment plan goals/objectives. Sandra is going to practice deep breathing and also plans to pay attention and notice her thoughts when she feels anxious.

## 2020-12-01 ASSESSMENT — ANXIETY QUESTIONNAIRES: GAD7 TOTAL SCORE: 3

## 2020-12-14 ENCOUNTER — OFFICE VISIT (OUTPATIENT)
Dept: PSYCHOLOGY | Facility: OTHER | Age: 30
End: 2020-12-14
Attending: COUNSELOR
Payer: COMMERCIAL

## 2020-12-14 DIAGNOSIS — F43.23 ADJUSTMENT DISORDER WITH MIXED ANXIETY AND DEPRESSED MOOD: Primary | ICD-10-CM

## 2020-12-14 PROCEDURE — 90837 PSYTX W PT 60 MINUTES: CPT | Performed by: COUNSELOR

## 2020-12-14 ASSESSMENT — ANXIETY QUESTIONNAIRES
3. WORRYING TOO MUCH ABOUT DIFFERENT THINGS: NOT AT ALL
5. BEING SO RESTLESS THAT IT IS HARD TO SIT STILL: NOT AT ALL
1. FEELING NERVOUS, ANXIOUS, OR ON EDGE: NOT AT ALL
IF YOU CHECKED OFF ANY PROBLEMS ON THIS QUESTIONNAIRE, HOW DIFFICULT HAVE THESE PROBLEMS MADE IT FOR YOU TO DO YOUR WORK, TAKE CARE OF THINGS AT HOME, OR GET ALONG WITH OTHER PEOPLE: NOT DIFFICULT AT ALL
2. NOT BEING ABLE TO STOP OR CONTROL WORRYING: NOT AT ALL
7. FEELING AFRAID AS IF SOMETHING AWFUL MIGHT HAPPEN: NOT AT ALL
GAD7 TOTAL SCORE: 0
6. BECOMING EASILY ANNOYED OR IRRITABLE: NOT AT ALL

## 2020-12-14 ASSESSMENT — PATIENT HEALTH QUESTIONNAIRE - PHQ9
5. POOR APPETITE OR OVEREATING: NOT AT ALL
SUM OF ALL RESPONSES TO PHQ QUESTIONS 1-9: 0

## 2020-12-14 NOTE — PROGRESS NOTES
Counseling Progress Note    Client Name: Sandra Sarabia    Date of Service (when I saw the patient): 12/14/2020    Service Type: Individual Therapy    Session Start Time: 3:30   Session End Time: 4:30  Session Length: I spent 53+ minutes performing psychotherapy during this office visit.  Session Number: 5    DSM5 Diagnoses: Adjustment Disorders  309.28 (F43.23) With mixed anxiety and depressed mood;  moderate    Attendees: Clinet     Health Maintenance Topics with due status: Overdue       Topic Date Due    DEPRESSION ACTION PLAN 1990       Treatment Plan Due Date: 02/19/2021  Diagnostic Assessment Update Date Due: 10/25/2021    DATA    Treatment Objective(s) Addressed in This Session: Reduce symptoms of anxiety and depression.    Progress on / Status of Treatment Objective(s) / Homework: Sandra reports continuing to feel limited anxiety and depression over the past week. She says she has been busy with [employer] stuff and keeping busy. She does also report enjoying her time doing such activities. She did at least two activities of enjoyment over the past two weeks. Sandra reports practicing deep breathing a few times and this being helpful. Sandra's dog, Junaid is being spade next week and she reports feeling a bit worried and reports being likely to worry more the closer it gets.    Intervention: Active listening, validation. Taught CBT cognitive distortions. Explored the 15 common cognitive distortions while Sandra identified ones that she may use. Talked about ways to manage or change these cognitive distortions. Discussed ways to work through fear and anxiety about her dogs surgery.     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 and Client denies other safety concerns.  A safety and risk management plan has not been developed at this time; however client was given the after-hours number should there be a change in any of these risk factors.    Appearance: awake, alert and well groomed  Eye Contact: good  Psychomotor Behavior: Normal  Attitude: cooperative  Orientation: time, person, and place  Speech: clear, coherent  Rate / Production: Normal  Volume: Normal  Mood: relaxed and sad at times  Affect: mood congruent  Thought Content: no auditory hallucinations present and no visual hallucinations present  Thought Form: Logical, Linear and Goal directed  Insight:    Collateral Reports Completed: None    PLAN: Weekly or bi-weekly individual therapy sessions as available. Continue working on treatment plan goals/objectives. Sandra is going to work on having awareness of her thinking patterns and bring any patterns or observations to her next session.

## 2020-12-15 ASSESSMENT — ANXIETY QUESTIONNAIRES: GAD7 TOTAL SCORE: 0

## 2021-01-14 NOTE — PROGRESS NOTES
Assessment & Plan     Depression, unspecified depression type / Anxiety  Improved. Follows with [facility] for therapy.   - no medications    Gastroesophageal reflux disease, unspecified whether esophagitis present  pepcid has helped with her symptoms  - sent staff message to inquire why insurance is not covering  - c/w pepcid    Encounter for routine adult health examination without abnormal findings  - CBC with platelets and differential  - Comprehensive metabolic panel (BMP + Alb, Alk Phos, ALT, AST, Total. Bili, TP)  - Lipid Profile    # HLD  ASCVD risk 0.5%.        # Thrombophila:  Most likely d/t recent URI which was consistent w/ COVID type symptoms  - monitor    # Wellness:  - Pap (2019): NILM, HPV negative  - Mammogram: NA d/t age  - STDs:  - Contraception: OCP w/ loryna  - Immunizations: UTD  - Lipids: ASCVD risk 0.5% with       See Patient Instructions    Return in about 1 year (around 2022), or if symptoms worsen or fail to improve, for Physical Exam, Lab Work.    Zee Pastor MD  [facility] - ARMAND Flores is a 30 year old who presents to clinic today for the following health issues     HPI     # Update: Sandra had COVID like symptoms early this year. Symptoms have resolved.     Depression and Anxiety Follow-Up    How are you doing with your depression since your last visit? Improved     How are you doing with your anxiety since your last visit?  Improved     Are you having other symptoms that might be associated with depression or anxiety? No    Have you had a significant life event? No     Do you have any concerns with your use of alcohol or other drugs? No    - follows with Argelia every week, needs to reschedule d/t changes in schedules  - start a new job childcare licensing    Social History     Tobacco Use     Smoking status: Former Smoker     Packs/day: 0.10     Types: Cigarettes     Quit date: 2018     Years since quittin.9      "Smokeless tobacco: Never Used   Substance Use Topics     Alcohol use: Yes     Alcohol/week: 0.0 standard drinks     Comment: occasionally     Drug use: No     PHQ 11/23/2020 11/30/2020 12/14/2020   PHQ-9 Total Score 2 1 0   Q9: Thoughts of better off dead/self-harm past 2 weeks Not at all Not at all Not at all     MARIAJOSE-7 SCORE 11/23/2020 11/30/2020 12/14/2020   Total Score 6 3 0         # GERD  - last visit: started pepcid, with good results  - insurance will not cover pepcid, asking for an alternative      Review of Systems   Constitutional: Negative for chills and fever.   HENT: Negative for congestion.    Respiratory: Negative for shortness of breath.    Cardiovascular: Negative for chest pain and palpitations.   Gastrointestinal: Negative for abdominal pain.   Psychiatric/Behavioral: Negative for dysphoric mood.            Objective    /60 (BP Location: Left arm, Patient Position: Sitting, Cuff Size: Adult Large)   Pulse 77   Temp 98.3  F (36.8  C) (Tympanic)   Resp 20   Ht 1.549 m (5' 1\")   Wt 88 kg (194 lb)   SpO2 98%   BMI 36.66 kg/m    Body mass index is 36.66 kg/m .  Physical Exam  Constitutional:       General: She is not in acute distress.     Appearance: She is not ill-appearing.   Cardiovascular:      Rate and Rhythm: Normal rate and regular rhythm.      Heart sounds: No murmur.   Pulmonary:      Effort: Pulmonary effort is normal. No respiratory distress.      Breath sounds: No wheezing or rales.   Neurological:      Mental Status: She is alert.   Psychiatric:         Mood and Affect: Mood normal.          Results for orders placed or performed in visit on 01/19/21 (from the past 24 hour(s))   CBC with platelets and differential   Result Value Ref Range    WBC 9.7 4.0 - 11.0 10e9/L    RBC Count 5.19 3.8 - 5.2 10e12/L    Hemoglobin 15.3 11.7 - 15.7 g/dL    Hematocrit 45.2 35.0 - 47.0 %    MCV 87 78 - 100 fl    MCH 29.5 26.5 - 33.0 pg    MCHC 33.8 31.5 - 36.5 g/dL    RDW 12.8 10.0 - 15.0 %    " Platelet Count 457 (H) 150 - 450 10e9/L    Diff Method Automated Method     % Neutrophils 53.7 %    % Lymphocytes 34.3 %    % Monocytes 9.2 %    % Eosinophils 1.7 %    % Basophils 0.8 %    % Immature Granulocytes 0.3 %    Nucleated RBCs 0 0 /100    Absolute Neutrophil 5.2 1.6 - 8.3 10e9/L    Absolute Lymphocytes 3.3 0.8 - 5.3 10e9/L    Absolute Monocytes 0.9 0.0 - 1.3 10e9/L    Absolute Eosinophils 0.2 0.0 - 0.7 10e9/L    Absolute Basophils 0.1 0.0 - 0.2 10e9/L    Abs Immature Granulocytes 0.0 0 - 0.4 10e9/L    Absolute Nucleated RBC 0.0    Comprehensive metabolic panel (BMP + Alb, Alk Phos, ALT, AST, Total. Bili, TP)   Result Value Ref Range    Sodium 138 133 - 144 mmol/L    Potassium 3.9 3.4 - 5.3 mmol/L    Chloride 106 94 - 109 mmol/L    Carbon Dioxide 27 20 - 32 mmol/L    Anion Gap 5 3 - 14 mmol/L    Glucose 78 70 - 99 mg/dL    Urea Nitrogen 10 7 - 30 mg/dL    Creatinine 0.66 0.52 - 1.04 mg/dL    GFR Estimate >90 >60 mL/min/[1.73_m2]    GFR Estimate If Black >90 >60 mL/min/[1.73_m2]    Calcium 9.3 8.5 - 10.1 mg/dL    Bilirubin Total 0.5 0.2 - 1.3 mg/dL    Albumin 3.5 3.4 - 5.0 g/dL    Protein Total 7.6 6.8 - 8.8 g/dL    Alkaline Phosphatase 77 40 - 150 U/L    ALT 34 0 - 50 U/L    AST 17 0 - 45 U/L   Lipid Profile   Result Value Ref Range    Cholesterol 235 (H) <200 mg/dL    Triglycerides 295 (H) <150 mg/dL    HDL Cholesterol 61 >49 mg/dL    LDL Cholesterol Calculated 115 (H) <100 mg/dL    Non HDL Cholesterol 174 (H) <130 mg/dL

## 2021-01-19 ENCOUNTER — OFFICE VISIT (OUTPATIENT)
Dept: FAMILY MEDICINE | Facility: OTHER | Age: 31
End: 2021-01-19
Attending: FAMILY MEDICINE
Payer: COMMERCIAL

## 2021-01-19 VITALS
RESPIRATION RATE: 20 BRPM | HEART RATE: 77 BPM | OXYGEN SATURATION: 98 % | SYSTOLIC BLOOD PRESSURE: 112 MMHG | BODY MASS INDEX: 36.63 KG/M2 | TEMPERATURE: 98.3 F | DIASTOLIC BLOOD PRESSURE: 60 MMHG | WEIGHT: 194 LBS | HEIGHT: 61 IN

## 2021-01-19 DIAGNOSIS — Z00.00 ENCOUNTER FOR ROUTINE ADULT HEALTH EXAMINATION WITHOUT ABNORMAL FINDINGS: ICD-10-CM

## 2021-01-19 DIAGNOSIS — F41.9 ANXIETY: ICD-10-CM

## 2021-01-19 DIAGNOSIS — K21.9 GASTROESOPHAGEAL REFLUX DISEASE, UNSPECIFIED WHETHER ESOPHAGITIS PRESENT: ICD-10-CM

## 2021-01-19 DIAGNOSIS — D68.59 THROMBOPHILIA (H): ICD-10-CM

## 2021-01-19 DIAGNOSIS — F32.A DEPRESSION, UNSPECIFIED DEPRESSION TYPE: Primary | ICD-10-CM

## 2021-01-19 LAB
ALBUMIN SERPL-MCNC: 3.5 G/DL (ref 3.4–5)
ALP SERPL-CCNC: 77 U/L (ref 40–150)
ALT SERPL W P-5'-P-CCNC: 34 U/L (ref 0–50)
ANION GAP SERPL CALCULATED.3IONS-SCNC: 5 MMOL/L (ref 3–14)
AST SERPL W P-5'-P-CCNC: 17 U/L (ref 0–45)
BASOPHILS # BLD AUTO: 0.1 10E9/L (ref 0–0.2)
BASOPHILS NFR BLD AUTO: 0.8 %
BILIRUB SERPL-MCNC: 0.5 MG/DL (ref 0.2–1.3)
BUN SERPL-MCNC: 10 MG/DL (ref 7–30)
CALCIUM SERPL-MCNC: 9.3 MG/DL (ref 8.5–10.1)
CHLORIDE SERPL-SCNC: 106 MMOL/L (ref 94–109)
CHOLEST SERPL-MCNC: 235 MG/DL
CO2 SERPL-SCNC: 27 MMOL/L (ref 20–32)
CREAT SERPL-MCNC: 0.66 MG/DL (ref 0.52–1.04)
DIFFERENTIAL METHOD BLD: ABNORMAL
EOSINOPHIL # BLD AUTO: 0.2 10E9/L (ref 0–0.7)
EOSINOPHIL NFR BLD AUTO: 1.7 %
ERYTHROCYTE [DISTWIDTH] IN BLOOD BY AUTOMATED COUNT: 12.8 % (ref 10–15)
GFR SERPL CREATININE-BSD FRML MDRD: >90 ML/MIN/{1.73_M2}
GLUCOSE SERPL-MCNC: 78 MG/DL (ref 70–99)
HCT VFR BLD AUTO: 45.2 % (ref 35–47)
HDLC SERPL-MCNC: 61 MG/DL
HGB BLD-MCNC: 15.3 G/DL (ref 11.7–15.7)
IMM GRANULOCYTES # BLD: 0 10E9/L (ref 0–0.4)
IMM GRANULOCYTES NFR BLD: 0.3 %
LDLC SERPL CALC-MCNC: 115 MG/DL
LYMPHOCYTES # BLD AUTO: 3.3 10E9/L (ref 0.8–5.3)
LYMPHOCYTES NFR BLD AUTO: 34.3 %
MCH RBC QN AUTO: 29.5 PG (ref 26.5–33)
MCHC RBC AUTO-ENTMCNC: 33.8 G/DL (ref 31.5–36.5)
MCV RBC AUTO: 87 FL (ref 78–100)
MONOCYTES # BLD AUTO: 0.9 10E9/L (ref 0–1.3)
MONOCYTES NFR BLD AUTO: 9.2 %
NEUTROPHILS # BLD AUTO: 5.2 10E9/L (ref 1.6–8.3)
NEUTROPHILS NFR BLD AUTO: 53.7 %
NONHDLC SERPL-MCNC: 174 MG/DL
NRBC # BLD AUTO: 0 10*3/UL
NRBC BLD AUTO-RTO: 0 /100
PLATELET # BLD AUTO: 457 10E9/L (ref 150–450)
POTASSIUM SERPL-SCNC: 3.9 MMOL/L (ref 3.4–5.3)
PROT SERPL-MCNC: 7.6 G/DL (ref 6.8–8.8)
RBC # BLD AUTO: 5.19 10E12/L (ref 3.8–5.2)
SODIUM SERPL-SCNC: 138 MMOL/L (ref 133–144)
TRIGL SERPL-MCNC: 295 MG/DL
WBC # BLD AUTO: 9.7 10E9/L (ref 4–11)

## 2021-01-19 PROCEDURE — 99214 OFFICE O/P EST MOD 30 MIN: CPT | Performed by: FAMILY MEDICINE

## 2021-01-19 PROCEDURE — 85025 COMPLETE CBC W/AUTO DIFF WBC: CPT | Performed by: FAMILY MEDICINE

## 2021-01-19 PROCEDURE — 80053 COMPREHEN METABOLIC PANEL: CPT | Performed by: FAMILY MEDICINE

## 2021-01-19 PROCEDURE — 36415 COLL VENOUS BLD VENIPUNCTURE: CPT | Performed by: FAMILY MEDICINE

## 2021-01-19 PROCEDURE — 80061 LIPID PANEL: CPT | Performed by: FAMILY MEDICINE

## 2021-01-19 ASSESSMENT — ENCOUNTER SYMPTOMS
DYSPHORIC MOOD: 0
ABDOMINAL PAIN: 0
PALPITATIONS: 0
SHORTNESS OF BREATH: 0
FEVER: 0
CHILLS: 0

## 2021-01-19 ASSESSMENT — MIFFLIN-ST. JEOR: SCORE: 1527.36

## 2021-01-19 ASSESSMENT — PAIN SCALES - GENERAL: PAINLEVEL: NO PAIN (0)

## 2021-01-19 NOTE — NURSING NOTE
"Chief Complaint   Patient presents with     Anxiety     Depression       Initial /60 (BP Location: Left arm, Patient Position: Sitting, Cuff Size: Adult Large)   Pulse 77   Temp 98.3  F (36.8  C) (Tympanic)   Resp 20   Ht 1.549 m (5' 1\")   Wt 88 kg (194 lb)   SpO2 98%   BMI 36.66 kg/m   Estimated body mass index is 36.66 kg/m  as calculated from the following:    Height as of this encounter: 1.549 m (5' 1\").    Weight as of this encounter: 88 kg (194 lb).  Medication Reconciliation: complete  Brigid Rodríguez LPN  "

## 2021-01-19 NOTE — LETTER
My Depression Action Plan  Name: Sandra Sarabia   Date of Birth 1990  Date: 1/15/2021    My doctor: Zee Pastor   My clinic: [facility] [place]  [place] [address]  [phone]          GREEN    ZONE   Good Control    What it looks like:     Things are going generally well. You have normal ups and downs. You may even feel depressed from time to time, but bad moods usually last less than a day.   What you need to do:  1. Continue to care for yourself (see self care plan)  2. Check your depression survival kit and update it as needed  3. Follow your physician s recommendations including any medication.  4. Do not stop taking medication unless you consult with your physician first.           YELLOW         ZONE Getting Worse    What it looks like:     Depression is starting to interfere with your life.     It may be hard to get out of bed; you may be starting to isolate yourself from others.    Symptoms of depression are starting to last most all day and this has happened for several days.     You may have suicidal thoughts but they are not constant.   What you need to do:     1. Call your care team. Your response to treatment will improve if you keep your care team informed of your progress. Yellow periods are signs an adjustment may need to be made.     2. Continue your self-care.  Just get dressed and ready for the day.  Don't give yourself time to talk yourself out of it.    3. Talk to someone in your support network.    4. Open up your Depression Self-Care Plan/Wellness Kit.           RED    ZONE Medical Alert - Get Help    What it looks like:     Depression is seriously interfering with your life.     You may experience these or other symptoms: You can t get out of bed most days, can t work or engage in other necessary activities, you have trouble taking care of basic hygiene, or basic responsibilities, thoughts of suicide or death that will not go away,  self-injurious behavior.     What you need to do:  1. Call your care team and request a same-day appointment. If they are not available (weekends or after hours) call your local crisis line, emergency room or 911.          Depression Self-Care Plan / Wellness Kit    Many people find that medication and therapy are helpful treatments for managing depression. In addition, making small changes to your everyday life can help to boost your mood and improve your wellbeing. Below are some tips for you to consider. Be sure to talk with your medical provider and/or behavioral health consultant if your symptoms are worsening or not improving.     Sleep   Sleep hygiene  means all of the habits that support good, restful sleep. It includes maintaining a consistent bedtime and wake time, using your bedroom only for sleeping or sex, and keeping the bedroom dark and free of distractions like a computer, smartphone, or television.     Develop a Healthy Routine  Maintain good hygiene. Get out of bed in the morning, make your bed, brush your teeth, take a shower, and get dressed. Don t spend too much time viewing media that makes you feel stressed. Find time to relax each day.    Exercise  Get some form of exercise every day. This will help reduce pain and release endorphins, the  feel good  chemicals in your brain. It can be as simple as just going for a walk or doing some gardening, anything that will get you moving.      Diet  Strive to eat healthy foods, including fruits and vegetables. Drink plenty of water. Avoid excessive sugar, caffeine, alcohol, and other mood-altering substances.     Stay Connected with Others  Stay in touch with friends and family members.    Manage Your Mood  Try deep breathing, massage therapy, biofeedback, or meditation. Take part in fun activities when you can. Try to find something to smile about each day.     Psychotherapy  Be open to working with a therapist if your provider recommends it.      Medication  Be sure to take your medication as prescribed. Most anti-depressants need to be taken every day. It usually takes several weeks for medications to work. Not all medicines work for all people. It is important to follow-up with your provider to make sure you have a treatment plan that is working for you. Do not stop your medication abruptly without first discussing it with your provider.    Crisis Resources   These hotlines are for both adults and children. They and are open 24 hours a day, 7 days a week unless noted otherwise.      National Suicide Prevention Lifeline   2-701-613-TALK (2652)      Crisis Text Line    www.crisistextline.org  Text HOME to 450944 from anywhere in the [place], anytime, about any type of crisis. A live, trained crisis counselor will receive the text and respond quickly.      [facility] Lifeline for LGBTQ Youth  A national crisis intervention and suicide lifeline for LGBTQ youth under 25. Provides a safe place to talk without judgement. Call 1-767.686.4212; text START to 031801 or visit www.thetrevorproject.org to talk to a trained counselor.      For [facility] crisis numbers, visit the [facility] website at:  https://[place].gov/dhs/people-we-serve/adults/health-care/mental-health/resources/crisis-contacts.jsp

## 2021-01-25 ENCOUNTER — TELEPHONE (OUTPATIENT)
Dept: FAMILY MEDICINE | Facility: OTHER | Age: 31
End: 2021-01-25

## 2021-01-25 NOTE — TELEPHONE ENCOUNTER
Prior Authorization Retail Medication Request  [facility] KEY# [id number]    Medication/Dose: PEPCID 20MG TABLETS  ICD code (if different than what is on RX):    Previously Tried and Failed:    Rationale:      Insurance Name:    Insurance ID:        Pharmacy Information (if different than what is on RX)  Name:    Phone:

## 2021-01-26 ENCOUNTER — MYC MEDICAL ADVICE (OUTPATIENT)
Dept: FAMILY MEDICINE | Facility: OTHER | Age: 31
End: 2021-01-26

## 2021-01-26 DIAGNOSIS — K21.9 GASTROESOPHAGEAL REFLUX DISEASE, UNSPECIFIED WHETHER ESOPHAGITIS PRESENT: Primary | ICD-10-CM

## 2021-01-26 NOTE — TELEPHONE ENCOUNTER
PA Initiation    Medication: PEPCID 20MG TABLETS  Insurance Company: [employer] [place] - Phone [phone] Fax [phone]  Pharmacy Filling the Rx: [id number] [address] [place] AT [facility] [address]  Filling Pharmacy Phone: [phone]  Filling Pharmacy Fax: [phone]  Start Date: 1/26/2021

## 2021-01-28 NOTE — TELEPHONE ENCOUNTER
PRIOR AUTHORIZATION DENIED    Medication: PEPCID 20MG TABLETS    Denial Date: 1/28/2021    Denial Rational: Patient did not meet criteria:        Appeal Information: If provider would like to appeal this decision we will need a detailed letter of medical necessity to start the process. Then re-route this request back to the PA pool.

## 2021-01-29 RX ORDER — FAMOTIDINE 40 MG/1
40 TABLET, FILM COATED ORAL DAILY
Qty: 90 TABLET | Refills: 1 | Status: SHIPPED | OUTPATIENT
Start: 2021-01-29 | End: 2021-07-28

## 2021-03-17 ENCOUNTER — TELEPHONE (OUTPATIENT)
Dept: PSYCHOLOGY | Facility: OTHER | Age: 31
End: 2021-03-17

## 2021-04-19 ENCOUNTER — OFFICE VISIT (OUTPATIENT)
Dept: PSYCHOLOGY | Facility: OTHER | Age: 31
End: 2021-04-19
Attending: COUNSELOR
Payer: COMMERCIAL

## 2021-04-19 DIAGNOSIS — F43.23 ADJUSTMENT DISORDER WITH MIXED ANXIETY AND DEPRESSED MOOD: Primary | ICD-10-CM

## 2021-04-19 PROCEDURE — 90837 PSYTX W PT 60 MINUTES: CPT | Performed by: COUNSELOR

## 2021-04-20 NOTE — PROGRESS NOTES
The author of this note documented a reason for not sharing it with the patient.    Counseling Progress Note    Client Name: Sandra Sarabia    Date of Service (when I saw the patient): 04/19/2021    Service Type: Individual Therapy    Session Start Time: 3:30   Session End Time: 4:30  Session Length: I spent 53+ minutes performing psychotherapy during this office visit.  Session Number: 6    DSM5 Diagnoses: Adjustment Disorders  309.28 (F43.23) With mixed anxiety and depressed mood;  moderate    Attendees: Clinet     Health Maintenance Topics with due status: Overdue       Topic Date Due    DEPRESSION ACTION PLAN 1990       Treatment Plan Due Date: 02/19/2021  Diagnostic Assessment Update Date Due: 10/25/2021    DATA    Treatment Objective(s) Addressed in This Session: Reduce symptoms of anxiety and depression.    Progress on / Status of Treatment Objective(s) / Homework: This was Sandra's first appointment since December. She reports things going pretty well. Sandra was tearful today when talking about her relationship with her mom. She connected with the idea of attempting to not take her mom's actions personally and plans to practice this.    Intervention: Active listening, reflection, open questions. Got caught up on details of her life with her new job. Processed conflict with her mom.     Current Stressors / Issues: Conflict with her mom, mental health symptoms    Medication Review: No medications    Medication Compliance: N/A    Changes in Health: None noted    Chemical Use Review: None  Substance Use: No    Tobacco Use: No    ASSESSMENT: Current Emotional / Mental Status (status of significant symptoms):  Risk status no evidence of suicidal or homicidal ideation  Client denies current fears or concerns for personal safety., Client denies current or recent suicidal ideation or behaviors., Client denies current or recent homicidal ideation or behaviors., Client denies current or recent self-injurious  behavior or ideation. and Client denies other safety concerns.  A safety and risk management plan has not been developed at this time; however client was given the after-hours number should there be a change in any of these risk factors.    Appearance: awake, alert and well groomed  Eye Contact: good  Psychomotor Behavior: Normal  Attitude: cooperative  Orientation: time, person, and place  Speech: clear, coherent  Rate / Production: Normal  Volume: Normal  Mood: relaxed, sad at times, tearful  Affect: mood congruent  Thought Content: no auditory hallucinations present and no visual hallucinations present  Thought Form: Logical, Linear and Goal directed  Insight:    Collateral Reports Completed: None    PLAN: Weekly or bi-weekly individual therapy sessions as available. Continue working on treatment plan goals/objectives. Sandra is going to work on having awareness of her thinking patterns and bring any patterns or observations to her next session.     Argelia Bowen, [facility]

## 2021-05-10 ENCOUNTER — OFFICE VISIT (OUTPATIENT)
Dept: PSYCHOLOGY | Facility: OTHER | Age: 31
End: 2021-05-10
Attending: COUNSELOR
Payer: COMMERCIAL

## 2021-05-10 DIAGNOSIS — F43.23 ADJUSTMENT DISORDER WITH MIXED ANXIETY AND DEPRESSED MOOD: Primary | ICD-10-CM

## 2021-05-10 PROCEDURE — 90837 PSYTX W PT 60 MINUTES: CPT | Performed by: COUNSELOR

## 2021-05-12 NOTE — PROGRESS NOTES
The author of this note documented a reason for not sharing it with the patient.    Counseling Progress Note    Client Name: Sandra Sarabia    Date of Service (when I saw the patient): 05/10/2021    Service Type: Individual Therapy    Session Start Time: 3:30   Session End Time: 4:30  Session Length: I spent 53+ minutes performing psychotherapy during this office visit.  Session Number: 7    DSM5 Diagnoses: Adjustment Disorders  309.28 (F43.23) With mixed anxiety and depressed mood;  moderate    Attendees: Clinet     Health Maintenance Topics with due status: Overdue       Topic Date Due    DEPRESSION ACTION PLAN 1990       Treatment Plan Due Date: 02/19/2021  Diagnostic Assessment Update Date Due: 10/25/2021    DATA    Treatment Objective(s) Addressed in This Session: Reduce symptoms of anxiety and depression.    Progress on / Status of Treatment Objective(s) / Homework: Sandra continues to report things going well. She reports some symptoms of depression and anxiety, however they are manageable at this time. Sandra reports learning positive coping strategies and her job change having a significant positive impact on her mental health.     Intervention: Active listening, reflection, open questions. Discussion about continued therapy appointments. Sandra and this clinician agreed that she has met set treatment plan goals. Provided encouragement for continue improvements and for her to not hesitate to schedule future appointments should she feel the need. Worked through [employer] [facility] as a guide for her to continue managing any symptoms of depression or anxiety.    Current Stressors / Issues: Conflict with her mom    Medication Review: No medications    Medication Compliance: N/A    Changes in Health: None noted    Chemical Use Review: None  Substance Use: No    Tobacco Use: No    ASSESSMENT: Current Emotional / Mental Status (status of significant symptoms):  Risk status no evidence of suicidal or homicidal  ideation  Client denies current fears or concerns for personal safety., Client denies current or recent suicidal ideation or behaviors., Client denies current or recent homicidal ideation or behaviors., Client denies current or recent self-injurious behavior or ideation. and Client denies other safety concerns.  A safety and risk management plan has not been developed at this time; however client was given the after-hours number should there be a change in any of these risk factors.    Appearance: awake, alert and well groomed  Eye Contact: good  Psychomotor Behavior: Normal  Attitude: cooperative  Orientation: time, person, and place  Speech: clear, coherent  Rate / Production: Normal  Volume: Normal  Mood: relaxed, sad at times, tearful  Affect: mood congruent  Thought Content: no auditory hallucinations present and no visual hallucinations present  Thought Form: Logical, Linear and Goal directed  Insight: Good    Collateral Reports Completed: None    PLAN: Terminated therapy today. Sandra has a plan to schedule again with this clinician should she experience any regression of symptoms or new symptoms.       Argelia Bowen, [facility]

## 2021-07-28 DIAGNOSIS — K21.9 GASTROESOPHAGEAL REFLUX DISEASE, UNSPECIFIED WHETHER ESOPHAGITIS PRESENT: ICD-10-CM

## 2021-07-28 RX ORDER — FAMOTIDINE 40 MG/1
TABLET, FILM COATED ORAL
Qty: 90 TABLET | Refills: 1 | Status: SHIPPED | OUTPATIENT
Start: 2021-07-28 | End: 2022-03-04

## 2021-07-28 NOTE — TELEPHONE ENCOUNTER
Pepcid 40 mg      Last Written Prescription Date:  1/29/21  Last Fill Quantity: 90,   # refills: 1  Last Office Visit: 1/19/21  Future Office visit:       Routing refill request to provider for review/approval because:

## 2021-11-02 ENCOUNTER — OFFICE VISIT (OUTPATIENT)
Dept: OBGYN | Facility: OTHER | Age: 31
End: 2021-11-02
Attending: NURSE PRACTITIONER
Payer: COMMERCIAL

## 2021-11-02 VITALS
SYSTOLIC BLOOD PRESSURE: 114 MMHG | WEIGHT: 200 LBS | HEIGHT: 61 IN | DIASTOLIC BLOOD PRESSURE: 67 MMHG | HEART RATE: 77 BPM | BODY MASS INDEX: 37.76 KG/M2 | OXYGEN SATURATION: 98 %

## 2021-11-02 DIAGNOSIS — Z01.419 WELL WOMAN EXAM WITH ROUTINE GYNECOLOGICAL EXAM: Primary | ICD-10-CM

## 2021-11-02 PROCEDURE — 99395 PREV VISIT EST AGE 18-39: CPT | Performed by: NURSE PRACTITIONER

## 2021-11-02 ASSESSMENT — PAIN SCALES - GENERAL: PAINLEVEL: NO PAIN (0)

## 2021-11-02 ASSESSMENT — PATIENT HEALTH QUESTIONNAIRE - PHQ9: SUM OF ALL RESPONSES TO PHQ QUESTIONS 1-9: 1

## 2021-11-02 ASSESSMENT — MIFFLIN-ST. JEOR: SCORE: 1549.57

## 2021-11-02 NOTE — PROGRESS NOTES
"LEXIE Sarabia is here today for a well woman exam. Last pap was normal/negative HPV in August of 2019. LMP 10/24/21. Does have regular menses while on RAISA, but did not have a bleed in July or August; reports poor pill compliance at times. Pt has since stopped taking RAISA last week; desires to be off of contraception for awhile. Not currently sexually active. Paragard as a hormone free contraceptive option reviewed with patient. Denies irregular vaginal discharge, odor, or itching. Denies other concerns today.     Remainder of exam to be completed with PCP.     ROS:     General: NEGATIVE for fever, chills, change in weight, or temperature intolerance  Psychiatric: NEGATIVE for changes in mood   Integumentary: NEGATIVE for changes in skin or hair, rashes, or lesions  HEENT: NEGATIVE for headaches, changes in vision or hearing  Breast: NEGATIVE for breast changes, tenderness, masses, skin changes, or nipple discharge  Respiratory: NEGATIVE for SOB, cough, or chest pain  Cardiac: NEGATIVE for palpitations or dizziness  Abdomen: NEGATIVE for abdominal pain, changes in bowel habits, and appetite changes  Genitourinary: Denies dysuria, pain during sexual activity, irregular vaginal bleeding or discharge  Musculoskeletal: Denies weakness and numbness/tingling    EXAM:     Vital Signs: /67 (BP Location: Right arm, Patient Position: Sitting, Cuff Size: Adult Regular)   Pulse 77   Ht 1.549 m (5' 1\")   Wt 90.7 kg (200 lb)   LMP 10/24/2021   SpO2 98%   BMI 37.79 kg/m    General: Pleasant without acute distress. Appropriate mood/affect  Integumentary: NEGATIVE for irregular nevi, rashes, or lesions  HEENT: NEGATIVE for thyroid nodules or tenderness, Lymph nodes nonpalpable and nontender  Breast: NEGATIVE for tenderness, masses, skin changes, or nipple discharge  Respiratory: Vesicular breath sounds noted in all lobes bilaterally. Regular and unlabored respirations  Cardiac: Regular S1, S2. No murmurs " detected  Abdomen: NEGATIVE for tenderness of lower quadrants.   Genitourinary:   External: Normal hair distribution, no lesions noted  Internal: Mucosa pink, moist & intact, rugae present, clear discharge present without odor. Cervix midline without erythema or lesions.      ASSESSMENT/PLAN:    1. Well woman exam with routine gynecological exam    - Continue healthy eating and daily exercise.   - Flu shot declined with education.  - Return to clinic for contraception when desired.     Follow up in one year for well woman care and as needed.

## 2021-11-02 NOTE — NURSING NOTE
"Chief Complaint   Patient presents with     Gyn Exam       Initial /67 (BP Location: Right arm, Patient Position: Sitting, Cuff Size: Adult Regular)   Pulse 77   Ht 1.549 m (5' 1\")   Wt 90.7 kg (200 lb)   LMP 10/24/2021   SpO2 98%   BMI 37.79 kg/m   Estimated body mass index is 37.79 kg/m  as calculated from the following:    Height as of this encounter: 1.549 m (5' 1\").    Weight as of this encounter: 90.7 kg (200 lb).  Medication Reconciliation: complete     Natalie Vera LPN    "

## 2022-01-18 NOTE — PROGRESS NOTES
Assessment & Plan     Depression, unspecified depression type  No medications, doing well    Skin tag  Next visit for cryotherapy    Discussed risks and benefits of COVID-vaccine.  Currently Sandra is declining COVID and influenza vaccines.     14 minutes spent on the date of the encounter doing chart review (ob/gyn), review of test results, interpretation of tests, patient visit, documentation       See Patient Instructions    Return in about 1 year (around 1/20/2023).    Zee Pastor MD  [facility] - ARMAND Flores is a 31 year old who presents for the following health issues     HPI       # Well woman exam completed by Marisol Jeffery on 11/2/2021    Depression and Anxiety Follow-Up    How are you doing with your depression since your last visit? No change    How are you doing with your anxiety since your last visit?  No change    Are you having other symptoms that might be associated with depression or anxiety? No    Have you had a significant life event? Grief or Loss     Do you have any concerns with your use of alcohol or other drugs? No    - follows with Argelia, stopped seeing her end of May 2021 d/t therapy completed    Social History     Tobacco Use     Smoking status: Former Smoker     Packs/day: 0.10     Types: Cigarettes     Quit date: 02/2018     Years since quitting: 3.9     Smokeless tobacco: Never Used   Substance Use Topics     Alcohol use: Yes     Alcohol/week: 0.0 standard drinks     Comment: occasionally     Drug use: No     PHQ 11/30/2020 12/14/2020 11/2/2021   PHQ-9 Total Score 1 0 1   Q9: Thoughts of better off dead/self-harm past 2 weeks Not at all Not at all Not at all     MARIAJOSE-7 SCORE 11/23/2020 11/30/2020 12/14/2020   Total Score 6 3 0     Last PHQ-9 11/2/2021   1.  Little interest or pleasure in doing things 0   2.  Feeling down, depressed, or hopeless 0   3.  Trouble falling or staying asleep, or sleeping too much 0   4.  Feeling tired or having little  energy 0   5.  Poor appetite or overeating 0   6.  Feeling bad about yourself 0   7.  Trouble concentrating 1   8.  Moving slowly or restless 0   Q9: Thoughts of better off dead/self-harm past 2 weeks 0   PHQ-9 Total Score 1   Difficulty at work, home, or with people Not difficult at all     MARIAJOSE-7  12/14/2020   1. Feeling nervous, anxious, or on edge 0   2. Not being able to stop or control worrying 0   3. Worrying too much about different things 0   4. Trouble relaxing 0   5. Being so restless that it is hard to sit still 0   6. Becoming easily annoyed or irritable 0   7. Feeling afraid, as if something awful might happen 0   MARIAJOSE-7 Total Score 0   If you checked any problems, how difficult have they made it for you to do your work, take care of things at home, or get along with other people? Not difficult at all       # COVID (declined d/t no long term studies), influenza(refused)    Review of Systems   Constitutional: Negative for chills and fever.   HENT: Negative for congestion.    Respiratory: Negative for shortness of breath.    Cardiovascular: Negative for chest pain and palpitations.   Gastrointestinal: Negative for abdominal pain.   Skin:        Skin tags   Psychiatric/Behavioral: Negative for mood changes.            Objective    /82 (BP Location: Right arm, Patient Position: Chair, Cuff Size: Adult Regular)   Pulse 62   Temp 97.9  F (36.6  C) (Tympanic)   Resp 18   Wt 82.3 kg (181 lb 8 oz)   SpO2 99%   BMI 34.29 kg/m    Body mass index is 34.29 kg/m .  Physical Exam  Constitutional:       General: She is not in acute distress.     Appearance: She is not ill-appearing.   Cardiovascular:      Rate and Rhythm: Normal rate and regular rhythm.      Heart sounds: No murmur heard.      Pulmonary:      Effort: Pulmonary effort is normal. No respiratory distress.      Breath sounds: No wheezing or rales.   Skin:     Comments: Very small skin tags on neck, back of neck, and left axilla   Neurological:       Mental Status: She is alert.   Psychiatric:         Mood and Affect: Mood normal.            Office Visit on 01/19/2021   Component Date Value Ref Range Status     WBC 01/19/2021 9.7  4.0 - 11.0 10e9/L Final     RBC Count 01/19/2021 5.19  3.8 - 5.2 10e12/L Final     Hemoglobin 01/19/2021 15.3  11.7 - 15.7 g/dL Final     Hematocrit 01/19/2021 45.2  35.0 - 47.0 % Final     MCV 01/19/2021 87  78 - 100 fl Final     MCH 01/19/2021 29.5  26.5 - 33.0 pg Final     MCHC 01/19/2021 33.8  31.5 - 36.5 g/dL Final     RDW 01/19/2021 12.8  10.0 - 15.0 % Final     Platelet Count 01/19/2021 457* 150 - 450 10e9/L Final     Diff Method 01/19/2021 Automated Method   Final     % Neutrophils 01/19/2021 53.7  % Final     % Lymphocytes 01/19/2021 34.3  % Final     % Monocytes 01/19/2021 9.2  % Final     % Eosinophils 01/19/2021 1.7  % Final     % Basophils 01/19/2021 0.8  % Final     % Immature Granulocytes 01/19/2021 0.3  % Final     Nucleated RBCs 01/19/2021 0  0 /100 Final     Absolute Neutrophil 01/19/2021 5.2  1.6 - 8.3 10e9/L Final     Absolute Lymphocytes 01/19/2021 3.3  0.8 - 5.3 10e9/L Final     Absolute Monocytes 01/19/2021 0.9  0.0 - 1.3 10e9/L Final     Absolute Eosinophils 01/19/2021 0.2  0.0 - 0.7 10e9/L Final     Absolute Basophils 01/19/2021 0.1  0.0 - 0.2 10e9/L Final     Abs Immature Granulocytes 01/19/2021 0.0  0 - 0.4 10e9/L Final     Absolute Nucleated RBC 01/19/2021 0.0   Final     Sodium 01/19/2021 138  133 - 144 mmol/L Final     Potassium 01/19/2021 3.9  3.4 - 5.3 mmol/L Final     Chloride 01/19/2021 106  94 - 109 mmol/L Final     Carbon Dioxide 01/19/2021 27  20 - 32 mmol/L Final     Anion Gap 01/19/2021 5  3 - 14 mmol/L Final     Glucose 01/19/2021 78  70 - 99 mg/dL Final    Non Fasting     Urea Nitrogen 01/19/2021 10  7 - 30 mg/dL Final     Creatinine 01/19/2021 0.66  0.52 - 1.04 mg/dL Final     GFR Estimate 01/19/2021 >90  >60 mL/min/[1.73_m2] Final    Comment: Non  GFR Calc  Starting  12/18/2018, serum creatinine based estimated GFR (eGFR) will be   calculated using the Chronic Kidney Disease Epidemiology Collaboration   (CKD-EPI) equation.       GFR Estimate If Black 01/19/2021 >90  >60 mL/min/[1.73_m2] Final    Comment:  GFR Calc  Starting 12/18/2018, serum creatinine based estimated GFR (eGFR) will be   calculated using the Chronic Kidney Disease Epidemiology Collaboration   (CKD-EPI) equation.       Calcium 01/19/2021 9.3  8.5 - 10.1 mg/dL Final     Bilirubin Total 01/19/2021 0.5  0.2 - 1.3 mg/dL Final     Albumin 01/19/2021 3.5  3.4 - 5.0 g/dL Final     Protein Total 01/19/2021 7.6  6.8 - 8.8 g/dL Final     Alkaline Phosphatase 01/19/2021 77  40 - 150 U/L Final     ALT 01/19/2021 34  0 - 50 U/L Final     AST 01/19/2021 17  0 - 45 U/L Final     Cholesterol 01/19/2021 235* <200 mg/dL Final    Desirable:       <200 mg/dl     Triglycerides 01/19/2021 295* <150 mg/dL Final    Comment: Borderline high:  150-199 mg/dl  High:             200-499 mg/dl  Very high:       >499 mg/dl  Non Fasting       HDL Cholesterol 01/19/2021 61  >49 mg/dL Final     LDL Cholesterol Calculated 01/19/2021 115* <100 mg/dL Final    Comment: Above desirable:  100-129 mg/dl  Borderline High:  130-159 mg/dL  High:             160-189 mg/dL  Very high:       >189 mg/dl       Non HDL Cholesterol 01/19/2021 174* <130 mg/dL Final    Comment: Above Desirable:  130-159 mg/dl  Borderline high:  160-189 mg/dl  High:             190-219 mg/dl  Very high:       >219 mg/dl

## 2022-01-20 ENCOUNTER — OFFICE VISIT (OUTPATIENT)
Dept: FAMILY MEDICINE | Facility: OTHER | Age: 32
End: 2022-01-20
Attending: FAMILY MEDICINE
Payer: COMMERCIAL

## 2022-01-20 VITALS
BODY MASS INDEX: 34.29 KG/M2 | WEIGHT: 181.5 LBS | HEART RATE: 62 BPM | RESPIRATION RATE: 18 BRPM | TEMPERATURE: 97.9 F | DIASTOLIC BLOOD PRESSURE: 82 MMHG | OXYGEN SATURATION: 99 % | SYSTOLIC BLOOD PRESSURE: 114 MMHG

## 2022-01-20 DIAGNOSIS — F32.A DEPRESSION, UNSPECIFIED DEPRESSION TYPE: Primary | ICD-10-CM

## 2022-01-20 DIAGNOSIS — L91.8 SKIN TAG: ICD-10-CM

## 2022-01-20 PROCEDURE — 99213 OFFICE O/P EST LOW 20 MIN: CPT | Performed by: FAMILY MEDICINE

## 2022-01-20 RX ORDER — VITAMIN B COMPLEX
TABLET ORAL DAILY
COMMUNITY

## 2022-01-20 ASSESSMENT — ENCOUNTER SYMPTOMS
FEVER: 0
ABDOMINAL PAIN: 0
CHILLS: 0
SHORTNESS OF BREATH: 0
ROS SKIN COMMENTS: SKIN TAGS
PALPITATIONS: 0

## 2022-01-20 ASSESSMENT — PAIN SCALES - GENERAL: PAINLEVEL: NO PAIN (0)

## 2022-01-20 NOTE — NURSING NOTE
"Chief Complaint   Patient presents with     Anxiety     Depression       Initial /82 (BP Location: Right arm, Patient Position: Chair, Cuff Size: Adult Regular)   Pulse 62   Temp 97.9  F (36.6  C) (Tympanic)   Resp 18   Wt 82.3 kg (181 lb 8 oz)   SpO2 99%   BMI 34.29 kg/m   Estimated body mass index is 34.29 kg/m  as calculated from the following:    Height as of 11/2/21: 1.549 m (5' 1\").    Weight as of this encounter: 82.3 kg (181 lb 8 oz).  Medication Reconciliation: complete  Saba Hobson LPN  "

## 2023-01-18 NOTE — PROGRESS NOTES
SUBJECTIVE:   CC: Sandra is an 32 year old who presents for preventive health visit.   Patient has been advised of split billing requirements and indicates understanding: Yes  Healthy Habits:     Getting at least 3 servings of Calcium per day:  Yes    Bi-annual eye exam:  Yes    Dental care twice a year:  Yes    Sleep apnea or symptoms of sleep apnea:  None    Diet:  Regular (no restrictions)    Frequency of exercise:  6-7 days/week    Duration of exercise:  15-30 minutes    Taking medications regularly:  Yes    Medication side effects:  None    PHQ-2 Total Score: 0    Additional concerns today:  No      Depression and Anxiety Follow-Up    How are you doing with your depression since your last visit? No change    How are you doing with your anxiety since your last visit?  No change    Are you having other symptoms that might be associated with depression or anxiety? No    Have you had a significant life event? No     Do you have any concerns with your use of alcohol or other drugs? No       Answers for HPI/ROS submitted by the patient on 2023  If you checked off any problems, how difficult have these problems made it for you to do your work, take care of things at home, or get along with other people?: Not difficult at all  PHQ9 TOTAL SCORE: 0  MARIAJOSE 7 TOTAL SCORE: 2        # GERD: doing well  - famotidine 40mg every day, will noticed if she missing a dose    # Acne  - did not have acne as a teenager  - has not anything     Social History     Tobacco Use     Smoking status: Former     Packs/day: 0.10     Types: Cigarettes     Quit date: 2018     Years since quittin.9     Smokeless tobacco: Never   Substance Use Topics     Alcohol use: Yes     Alcohol/week: 0.0 standard drinks     Comment: occasionally     Drug use: No     PHQ 2020   PHQ-9 Total Score 0 1 0   Q9: Thoughts of better off dead/self-harm past 2 weeks Not at all Not at all Not at all     MARIAJOSE-7 SCORE 2020  12/14/2020 1/23/2023   Total Score - - 2 (minimal anxiety)   Total Score 3 0 2     Last PHQ-9 1/23/2023   1.  Little interest or pleasure in doing things 0   2.  Feeling down, depressed, or hopeless 0   3.  Trouble falling or staying asleep, or sleeping too much 0   4.  Feeling tired or having little energy 0   5.  Poor appetite or overeating 0   6.  Feeling bad about yourself 0   7.  Trouble concentrating 0   8.  Moving slowly or restless 0   Q9: Thoughts of better off dead/self-harm past 2 weeks 0   PHQ-9 Total Score 0   Difficulty at work, home, or with people -     MARIAJOSE-7  1/23/2023   1. Feeling nervous, anxious, or on edge 1   2. Not being able to stop or control worrying 0   3. Worrying too much about different things 1   4. Trouble relaxing 0   5. Being so restless that it is hard to sit still 0   6. Becoming easily annoyed or irritable 0   7. Feeling afraid, as if something awful might happen 0   MARIAJOSE-7 Total Score 2   If you checked any problems, how difficult have they made it for you to do your work, take care of things at home, or get along with other people? Not difficult at all         Today's PHQ-2 Score:   PHQ-2 ( 1999 [employer]) 1/23/2023   Q1: Little interest or pleasure in doing things 0   Q2: Feeling down, depressed or hopeless 0   PHQ-2 Score 0   PHQ-2 Total Score (12-17 Years)- Positive if 3 or more points; Administer PHQ-A if positive -   Q1: Little interest or pleasure in doing things Not at all   Q2: Feeling down, depressed or hopeless Not at all   PHQ-2 Score 0       Have you ever done Advance Care Planning? (For example, a Health Directive, POLST, or a discussion with a medical provider or your loved ones about your wishes): No, advance care planning information given to patient to review.  Patient plans to discuss their wishes with loved ones or provider.      Social History     Tobacco Use     Smoking status: Former     Packs/day: 0.10     Types: Cigarettes     Quit date: 02/2018     Years  since quittin.9     Smokeless tobacco: Never   Substance Use Topics     Alcohol use: Yes     Alcohol/week: 0.0 standard drinks     Comment: occasionally     If you drink alcohol do you typically have >3 drinks per day or >7 drinks per week? No    Alcohol Use 2023   Prescreen: >3 drinks/day or >7 drinks/week? No   Prescreen: >3 drinks/day or >7 drinks/week? -   No flowsheet data found.    Reviewed orders with patient.  Reviewed health maintenance and updated orders accordingly - Yes  BP Readings from Last 3 Encounters:   23 108/64   22 114/82   21 114/67    Wt Readings from Last 3 Encounters:   23 85.9 kg (189 lb 7 oz)   22 82.3 kg (181 lb 8 oz)   21 90.7 kg (200 lb)                  Patient Active Problem List   Diagnosis     ACP (advance care planning)     Tobacco abuse counseling     TMJ (temporomandibular joint syndrome)     Anxiety     Depression, unspecified depression type     Gastroesophageal reflux disease, unspecified whether esophagitis present     Past Surgical History:   Procedure Laterality Date     SOFT TISSUE SURGERY      right inner thigh I and D       Social History     Tobacco Use     Smoking status: Former     Packs/day: 0.10     Types: Cigarettes     Quit date: 2018     Years since quittin.9     Smokeless tobacco: Never   Substance Use Topics     Alcohol use: Yes     Alcohol/week: 0.0 standard drinks     Comment: occasionally     Family History   Problem Relation Age of Onset     Hypertension Mother      Myocardial Infarction Father      Coronary Artery Disease Father      Coronary Artery Disease Maternal Grandmother      Diabetes Paternal Grandfather      Hyperlipidemia No family hx of      Asthma No family hx of      Thyroid Disease No family hx of      Colon Cancer No family hx of      Breast Cancer No family hx of          Current Outpatient Medications   Medication Sig Dispense Refill     Ascorbic Acid (VITAMIN C PO)        famotidine  (PEPCID) 40 MG tablet TAKE 1 TABLET(40 MG) BY MOUTH DAILY 90 tablet 3     Multiple Vitamins-Minerals (MULTIVITAMIN ADULT PO)        Vitamin D3 (CHOLECALCIFEROL) 25 mcg (1000 units) tablet Take by mouth daily       Allergies   Allergen Reactions     Amoxicillin Hives     Penicillins Hives       Breast Cancer Screening:  Any new diagnosis of family breast, ovarian, or bowel cancer? No    FHS-7: No flowsheet data found.    Patient under 40 years of age: Routine Mammogram Screening not recommended.   Pertinent mammograms are reviewed under the imaging tab.    History of abnormal Pap smear:   PAP / HPV Latest Ref Rng & Units 8/27/2019 7/20/2018 6/21/2016   PAP (Historical) - NIL NIL NIL   HPV16 NEG:Negative Negative Negative -   HPV18 NEG:Negative Negative Negative -   HRHPV NEG:Negative Negative Negative -     # Wellness:  - Pap: UTD  - Mammogram: NA  - STDs: no concern  - Contraception: declines. Okay with pregnancy   - Immunizations: COVID (declines), Tdap (updating today), influenza (declines)  - Lipids: updating today  - Dexa scan: NA  - Colon cancer screening: NA  - Lung cancer screening: quit smoking 2018  - AAA screening:       - doing beach body and walking     Wt Readings from Last 4 Encounters:   01/23/23 85.9 kg (189 lb 7 oz)   01/20/22 82.3 kg (181 lb 8 oz)   11/02/21 90.7 kg (200 lb)   01/19/21 88 kg (194 lb)         Reviewed and updated as needed this visit by clinical staff   Tobacco  Allergies  Meds  Problems  Med Hx  Surg Hx  Fam Hx          Reviewed and updated as needed this visit by Provider   Tobacco  Allergies  Meds  Problems  Med Hx  Surg Hx  Fam Hx             Review of Systems   Constitutional: Negative for chills and fever.   HENT: Negative for congestion.    Respiratory: Negative for shortness of breath.    Cardiovascular: Negative for chest pain and palpitations.   Gastrointestinal: Negative for abdominal pain and heartburn (controlled).   Breasts:  Negative for tenderness and  discharge.   Genitourinary: Negative for difficulty urinating, vaginal bleeding and vaginal discharge.   Psychiatric/Behavioral: Negative for dysphoric mood. The patient is not nervous/anxious.         OBJECTIVE:   /64 (BP Location: Left arm, Patient Position: Chair, Cuff Size: Adult Regular)   Pulse 65   Temp 98.5  F (36.9  C) (Tympanic)   Resp 18   Wt 85.9 kg (189 lb 7 oz)   SpO2 98%   BMI 35.79 kg/m    Physical Exam  Constitutional:       General: She is not in acute distress.     Appearance: She is well-developed.   HENT:      Head: Normocephalic and atraumatic.      Right Ear: Hearing and tympanic membrane normal.      Left Ear: Hearing and tympanic membrane normal.      Mouth/Throat:      Mouth: Mucous membranes are moist.      Pharynx: No oropharyngeal exudate.   Eyes:      Extraocular Movements: Extraocular movements intact.      Pupils: Pupils are equal, round, and reactive to light.   Neck:      Thyroid: No thyromegaly.   Cardiovascular:      Rate and Rhythm: Normal rate and regular rhythm.      Pulses: Normal pulses.      Heart sounds: Normal heart sounds. No murmur heard.  Pulmonary:      Effort: Pulmonary effort is normal. No respiratory distress.      Breath sounds: Normal breath sounds. No wheezing or rales.   Abdominal:      General: Bowel sounds are normal. There is no distension.      Palpations: Abdomen is soft.      Tenderness: There is no abdominal tenderness. There is no guarding.   Musculoskeletal:         General: Normal range of motion.      Cervical back: Normal range of motion and neck supple.   Lymphadenopathy:      Cervical: No cervical adenopathy.   Skin:     General: Skin is dry.      Comments: Jaw line: pustules (which drain) with possible underlying eczema    Neurological:      Mental Status: She is alert.   Psychiatric:         Mood and Affect: Mood normal.         Diagnostic Test Results:  Results for orders placed or performed in visit on 01/23/23 (from the past 24  hour(s))   Lipid Profile (Chol, Trig, HDL, LDL calc)   Result Value Ref Range    Cholesterol 156 <200 mg/dL    Triglycerides 77 <150 mg/dL    Direct Measure HDL 48 (L) >=50 mg/dL    LDL Cholesterol Calculated 93 <=100 mg/dL    Non HDL Cholesterol 108 <130 mg/dL    Narrative    Cholesterol  Desirable:  <200 mg/dL    Triglycerides  Normal:  Less than 150 mg/dL  Borderline High:  150-199 mg/dL  High:  200-499 mg/dL  Very High:  Greater than or equal to 500 mg/dL    Direct Measure HDL  Female:  Greater than or equal to 50 mg/dL   Male:  Greater than or equal to 40 mg/dL    LDL Cholesterol  Desirable:  <100mg/dL  Above Desirable:  100-129 mg/dL   Borderline High:  130-159 mg/dL   High:  160-189 mg/dL   Very High:  >= 190 mg/dL    Non HDL Cholesterol  Desirable:  130 mg/dL  Above Desirable:  130-159 mg/dL  Borderline High:  160-189 mg/dL  High:  190-219 mg/dL  Very High:  Greater than or equal to 220 mg/dL   Basic metabolic panel   Result Value Ref Range    Sodium 142 136 - 145 mmol/L    Potassium 4.1 3.4 - 5.3 mmol/L    Chloride 107 98 - 107 mmol/L    Carbon Dioxide (CO2) 22 22 - 29 mmol/L    Anion Gap 13 7 - 15 mmol/L    Urea Nitrogen 13.6 6.0 - 20.0 mg/dL    Creatinine 0.76 0.51 - 0.95 mg/dL    Calcium 9.3 8.6 - 10.0 mg/dL    Glucose 66 (L) 70 - 99 mg/dL    GFR Estimate >90 >60 mL/min/1.73m2   CBC with platelets   Result Value Ref Range    WBC Count 9.4 4.0 - 11.0 10e3/uL    RBC Count 4.89 3.80 - 5.20 10e6/uL    Hemoglobin 14.9 11.7 - 15.7 g/dL    Hematocrit 43.1 35.0 - 47.0 %    MCV 88 78 - 100 fL    MCH 30.5 26.5 - 33.0 pg    MCHC 34.6 31.5 - 36.5 g/dL    RDW 13.0 10.0 - 15.0 %    Platelet Count 289 150 - 450 10e3/uL       ASSESSMENT/PLAN:   (Z00.00) Routine general medical examination at a health care facility  (primary encounter diagnosis)  Comment: Declined COVID and influenza otherwise UTD with preventive cares  Plan: Basic metabolic panel, CBC with platelets        Repeat exam in one year     (K21.9)  Gastroesophageal reflux disease, unspecified whether esophagitis present  Comment: well controlled with diet and famotidine   Plan: c/w  Famotidine     (F32.A) Depression, unspecified depression type / (F41.9) Anxiety  Comment: stable  Plan: no medications      (Z13.220) Lipid screening  Comment: LDL today of 93  Plan: Lipid Profile (Chol, Trig, HDL, LDL calc)  Sandra is exercising and watching her diet    (Z23) Need for vaccination  Comment:   Plan: TDAP VACCINE (Adacel, Boostrix)          # Hypoglycemia   - encourage consistent meals         COUNSELING:  Reviewed preventive health counseling, as reflected in patient instructions        She reports that she quit smoking about 4 years ago. Her smoking use included cigarettes. She smoked an average of .1 packs per day. She has never used smokeless tobacco.          Zee Pastor MD  [facility] [place]

## 2023-01-18 NOTE — PATIENT INSTRUCTIONS
"We will call you with your lab results.         Instructions for Acne Care:    Gently wash your face and body at least once daily with mild soap or cleanser (Cetaphil, Neutrogena, or Purpose) or your prescribed medicated wash. Avoid scrubbing, toners / astringents, or scrubs. Pat, don't rub, the skin dry.   Do not squeeze or pick pimples as this leads to scarring.  For moisturizing lotions, sunscreens and / or cosmetics, use only non-comedogenic (won't clog pores) products. Terms like \"dermatologist-tested\", \"oil-free\" and \"hypoallergenic\" are not sufficient.   Use medications as prescribed. Topical medications are meant to be applied to the entire face, or anywhere your are breaking out (chest/back). Do not spot treat. The medications work better if you use them every day.   Only use as much of the medications as you and your doctor discussed. More is not necessarily better.  For female patients: talk to your doctor if you are pregnant or planning on becoming pregnant as many medications used to treat acne cannot be used in pregnancy or breastfeeding.     Benzoyl peroxide: Medicated washes that contain benzoyl peroxide may bleach clothing, towels, and sheets. Make sure to rinse after using or use white towels/linens. Allergy to benzoyl peroxide can also occur. Be sure to stop this product if increasing redness/burning is note with application.  Example: Clean and Clear, Panoxyl. Look at active ingredient list to find wash with 10% benzoyl peroxide.          Preventive Health Recommendations  Female Ages 26 - 39  Yearly exam:   See your health care provider every year in order to  Review health changes.   Discuss preventive care.    Review your medicines if you your doctor has prescribed any.    Until age 30: Get a Pap test every three years (more often if you have had an abnormal result).    After age 30: Talk to your doctor about whether you should have a Pap test every 3 years or have a Pap test with HPV " screening every 5 years.   You do not need a Pap test if your uterus was removed (hysterectomy) and you have not had cancer.  You should be tested each year for STDs (sexually transmitted diseases), if you're at risk.   Talk to your provider about how often to have your cholesterol checked.  If you are at risk for diabetes, you should have a diabetes test (fasting glucose).  Shots: Get a flu shot each year. Get a tetanus shot every 10 years.   Nutrition:   Eat at least 5 servings of fruits and vegetables each day.  Eat whole-grain bread, whole-wheat pasta and brown rice instead of white grains and rice.  Get adequate Calcium and Vitamin D.     Lifestyle  Exercise at least 150 minutes a week (30 minutes a day, 5 days of the week). This will help you control your weight and prevent disease.  Limit alcohol to one drink per day.  No smoking.   Wear sunscreen to prevent skin cancer.  See your dentist every six months for an exam and cleaning.

## 2023-01-23 ENCOUNTER — APPOINTMENT (OUTPATIENT)
Dept: LAB | Facility: OTHER | Age: 33
End: 2023-01-23
Attending: FAMILY MEDICINE
Payer: COMMERCIAL

## 2023-01-23 ENCOUNTER — OFFICE VISIT (OUTPATIENT)
Dept: FAMILY MEDICINE | Facility: OTHER | Age: 33
End: 2023-01-23
Attending: FAMILY MEDICINE
Payer: COMMERCIAL

## 2023-01-23 VITALS
OXYGEN SATURATION: 98 % | WEIGHT: 189.44 LBS | BODY MASS INDEX: 35.79 KG/M2 | SYSTOLIC BLOOD PRESSURE: 108 MMHG | DIASTOLIC BLOOD PRESSURE: 64 MMHG | HEART RATE: 65 BPM | TEMPERATURE: 98.5 F | RESPIRATION RATE: 18 BRPM

## 2023-01-23 DIAGNOSIS — Z00.00 ROUTINE GENERAL MEDICAL EXAMINATION AT A HEALTH CARE FACILITY: Primary | ICD-10-CM

## 2023-01-23 DIAGNOSIS — F32.A DEPRESSION, UNSPECIFIED DEPRESSION TYPE: ICD-10-CM

## 2023-01-23 DIAGNOSIS — K21.9 GASTROESOPHAGEAL REFLUX DISEASE, UNSPECIFIED WHETHER ESOPHAGITIS PRESENT: ICD-10-CM

## 2023-01-23 DIAGNOSIS — F41.9 ANXIETY: ICD-10-CM

## 2023-01-23 DIAGNOSIS — Z23 NEED FOR VACCINATION: ICD-10-CM

## 2023-01-23 DIAGNOSIS — E16.2 HYPOGLYCEMIA: ICD-10-CM

## 2023-01-23 DIAGNOSIS — Z13.220 LIPID SCREENING: ICD-10-CM

## 2023-01-23 LAB
ANION GAP SERPL CALCULATED.3IONS-SCNC: 13 MMOL/L (ref 7–15)
BUN SERPL-MCNC: 13.6 MG/DL (ref 6–20)
CALCIUM SERPL-MCNC: 9.3 MG/DL (ref 8.6–10)
CHLORIDE SERPL-SCNC: 107 MMOL/L (ref 98–107)
CHOLEST SERPL-MCNC: 156 MG/DL
CREAT SERPL-MCNC: 0.76 MG/DL (ref 0.51–0.95)
DEPRECATED HCO3 PLAS-SCNC: 22 MMOL/L (ref 22–29)
ERYTHROCYTE [DISTWIDTH] IN BLOOD BY AUTOMATED COUNT: 13 % (ref 10–15)
GFR SERPL CREATININE-BSD FRML MDRD: >90 ML/MIN/1.73M2
GLUCOSE SERPL-MCNC: 66 MG/DL (ref 70–99)
HCT VFR BLD AUTO: 43.1 % (ref 35–47)
HDLC SERPL-MCNC: 48 MG/DL
HGB BLD-MCNC: 14.9 G/DL (ref 11.7–15.7)
LDLC SERPL CALC-MCNC: 93 MG/DL
MCH RBC QN AUTO: 30.5 PG (ref 26.5–33)
MCHC RBC AUTO-ENTMCNC: 34.6 G/DL (ref 31.5–36.5)
MCV RBC AUTO: 88 FL (ref 78–100)
NONHDLC SERPL-MCNC: 108 MG/DL
PLATELET # BLD AUTO: 289 10E3/UL (ref 150–450)
POTASSIUM SERPL-SCNC: 4.1 MMOL/L (ref 3.4–5.3)
RBC # BLD AUTO: 4.89 10E6/UL (ref 3.8–5.2)
SODIUM SERPL-SCNC: 142 MMOL/L (ref 136–145)
TRIGL SERPL-MCNC: 77 MG/DL
WBC # BLD AUTO: 9.4 10E3/UL (ref 4–11)

## 2023-01-23 PROCEDURE — 36415 COLL VENOUS BLD VENIPUNCTURE: CPT | Performed by: FAMILY MEDICINE

## 2023-01-23 PROCEDURE — 99395 PREV VISIT EST AGE 18-39: CPT | Mod: 25 | Performed by: FAMILY MEDICINE

## 2023-01-23 PROCEDURE — 80061 LIPID PANEL: CPT | Performed by: FAMILY MEDICINE

## 2023-01-23 PROCEDURE — 85027 COMPLETE CBC AUTOMATED: CPT | Performed by: FAMILY MEDICINE

## 2023-01-23 PROCEDURE — 90471 IMMUNIZATION ADMIN: CPT | Performed by: FAMILY MEDICINE

## 2023-01-23 PROCEDURE — 90715 TDAP VACCINE 7 YRS/> IM: CPT | Performed by: FAMILY MEDICINE

## 2023-01-23 PROCEDURE — 80048 BASIC METABOLIC PNL TOTAL CA: CPT | Performed by: FAMILY MEDICINE

## 2023-01-23 RX ORDER — ADAPALENE 0.1 G/100G
CREAM TOPICAL
Status: CANCELLED | OUTPATIENT
Start: 2023-01-23

## 2023-01-23 ASSESSMENT — ENCOUNTER SYMPTOMS
DIFFICULTY URINATING: 0
CHILLS: 0
NERVOUS/ANXIOUS: 0
ABDOMINAL PAIN: 0
FEVER: 0
DYSPHORIC MOOD: 0
SHORTNESS OF BREATH: 0
HEARTBURN: 0
PALPITATIONS: 0

## 2023-01-23 ASSESSMENT — PATIENT HEALTH QUESTIONNAIRE - PHQ9
SUM OF ALL RESPONSES TO PHQ QUESTIONS 1-9: 0
SUM OF ALL RESPONSES TO PHQ QUESTIONS 1-9: 0
10. IF YOU CHECKED OFF ANY PROBLEMS, HOW DIFFICULT HAVE THESE PROBLEMS MADE IT FOR YOU TO DO YOUR WORK, TAKE CARE OF THINGS AT HOME, OR GET ALONG WITH OTHER PEOPLE: NOT DIFFICULT AT ALL

## 2023-01-23 ASSESSMENT — ANXIETY QUESTIONNAIRES
4. TROUBLE RELAXING: NOT AT ALL
1. FEELING NERVOUS, ANXIOUS, OR ON EDGE: SEVERAL DAYS
7. FEELING AFRAID AS IF SOMETHING AWFUL MIGHT HAPPEN: NOT AT ALL
2. NOT BEING ABLE TO STOP OR CONTROL WORRYING: NOT AT ALL
8. IF YOU CHECKED OFF ANY PROBLEMS, HOW DIFFICULT HAVE THESE MADE IT FOR YOU TO DO YOUR WORK, TAKE CARE OF THINGS AT HOME, OR GET ALONG WITH OTHER PEOPLE?: NOT DIFFICULT AT ALL
GAD7 TOTAL SCORE: 2
IF YOU CHECKED OFF ANY PROBLEMS ON THIS QUESTIONNAIRE, HOW DIFFICULT HAVE THESE PROBLEMS MADE IT FOR YOU TO DO YOUR WORK, TAKE CARE OF THINGS AT HOME, OR GET ALONG WITH OTHER PEOPLE: NOT DIFFICULT AT ALL
3. WORRYING TOO MUCH ABOUT DIFFERENT THINGS: SEVERAL DAYS
5. BEING SO RESTLESS THAT IT IS HARD TO SIT STILL: NOT AT ALL
7. FEELING AFRAID AS IF SOMETHING AWFUL MIGHT HAPPEN: NOT AT ALL
GAD7 TOTAL SCORE: 2
6. BECOMING EASILY ANNOYED OR IRRITABLE: NOT AT ALL
GAD7 TOTAL SCORE: 2

## 2023-01-23 ASSESSMENT — PAIN SCALES - GENERAL: PAINLEVEL: NO PAIN (0)

## 2023-03-26 DIAGNOSIS — K21.9 GASTROESOPHAGEAL REFLUX DISEASE, UNSPECIFIED WHETHER ESOPHAGITIS PRESENT: ICD-10-CM

## 2023-03-27 RX ORDER — FAMOTIDINE 40 MG/1
TABLET, FILM COATED ORAL
Qty: 90 TABLET | Refills: 2 | Status: SHIPPED | OUTPATIENT
Start: 2023-03-27

## 2023-06-01 ENCOUNTER — OFFICE VISIT (OUTPATIENT)
Dept: PSYCHOLOGY | Facility: OTHER | Age: 33
End: 2023-06-01
Attending: COUNSELOR
Payer: COMMERCIAL

## 2023-06-01 DIAGNOSIS — F41.9 ANXIETY: Primary | ICD-10-CM

## 2023-06-01 DIAGNOSIS — F32.A DEPRESSION, UNSPECIFIED DEPRESSION TYPE: ICD-10-CM

## 2023-06-01 PROCEDURE — 90791 PSYCH DIAGNOSTIC EVALUATION: CPT | Performed by: COUNSELOR

## 2023-06-02 ASSESSMENT — ANXIETY QUESTIONNAIRES
3. WORRYING TOO MUCH ABOUT DIFFERENT THINGS: SEVERAL DAYS
1. FEELING NERVOUS, ANXIOUS, OR ON EDGE: SEVERAL DAYS
IF YOU CHECKED OFF ANY PROBLEMS ON THIS QUESTIONNAIRE, HOW DIFFICULT HAVE THESE PROBLEMS MADE IT FOR YOU TO DO YOUR WORK, TAKE CARE OF THINGS AT HOME, OR GET ALONG WITH OTHER PEOPLE: SOMEWHAT DIFFICULT
6. BECOMING EASILY ANNOYED OR IRRITABLE: SEVERAL DAYS
7. FEELING AFRAID AS IF SOMETHING AWFUL MIGHT HAPPEN: NOT AT ALL
GAD7 TOTAL SCORE: 4
GAD7 TOTAL SCORE: 4
2. NOT BEING ABLE TO STOP OR CONTROL WORRYING: SEVERAL DAYS
5. BEING SO RESTLESS THAT IT IS HARD TO SIT STILL: NOT AT ALL

## 2023-06-02 ASSESSMENT — PATIENT HEALTH QUESTIONNAIRE - PHQ9
SUM OF ALL RESPONSES TO PHQ QUESTIONS 1-9: 2
5. POOR APPETITE OR OVEREATING: NOT AT ALL
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
## 2023-06-08 ENCOUNTER — OFFICE VISIT (OUTPATIENT)
Dept: PSYCHOLOGY | Facility: OTHER | Age: 33
End: 2023-06-08
Attending: COUNSELOR
Payer: COMMERCIAL

## 2023-06-08 DIAGNOSIS — F43.9 TRAUMA AND STRESSOR-RELATED DISORDER: Primary | ICD-10-CM

## 2023-06-08 PROCEDURE — 90837 PSYTX W PT 60 MINUTES: CPT | Performed by: COUNSELOR

## 2023-06-08 NOTE — CONFIDENTIAL NOTE
"      Counseling Progress Note    Client Name: Sandra Sarabia    Date of Service (when I saw the patient): 06/08/2223    Service Type: Individual Psychotherapy    Session Start Time: 2:00pm   Session End Time: 2:56pm  Session Length: I spent 53+ minutes performing psychotherapy during this office visit.  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) Unspecified Trauma and Stressor Related Disorder    Attendees: Client     The patient has no Health Maintenance topics of status Overdue, Due On, or Due Soon    Treatment Plan Due Date: 06/08/2023  Diagnostic Assessment Update Due: 05/31/2024    DATA    Treatment Objective(s) Addressed in This Session: Decrease psychological distress, increase positive emotions, decrease irritability, improve interpersonal relationship skills/healthy communication    Progress on / Status of Treatment Objective(s) / Homework: Sandra reports feeling highly emotional and often breaking out into tears with little to no warning. She talked about struggles in dating especially using on-line dating. She made great connections in her interactions / reactions to the way that her mom responds if/when she gets upset about something. Sandra recognizes that being ghosted or having someone not respond quickly, can cause her to be activated. She connected this to her mom giving the silent treatment when she gets upset with her. Sandra says she makes effort to talk with her mom and she will not do so. She is open to trying again and using different approaches.     Intervention: Relational approach. Explored her strong reaction to someone not responding or responding in the \"right\" way. Looked at her rigidity in the ways she believes people should act and explored alternative ways to be.     Current Stressors / Issues: Conflict with mom, grief/loss - traumatic death of a close friend    Medication Review: No changes    Medication Compliance: Reports taking medications as prescribed    Changes in " Health: Non noted    Chemical Use Review: No  Substance Use: No, occasional social drinking    Tobacco Use: No    ASSESSMENT: Current Emotional / Mental Status (status of significant symptoms):  Risk status no evidence of suicidal or homicidal ideation  Client denies current fears or concerns for personal safety.  A safety and risk management plan has not been developed at this time; however client was given the after-hours number should there be a change in any of these risk factors.    Appearance: awake, alert  Eye Contact: good  Psychomotor Behavior: no evidence of tardive dyskinesia, dystonia, or tics  Attitude: cooperative  Orientation: time, person, and place  Speech: clear, coherent  Rate / Production: Normal  Volume: Normal  Mood: depressed  Affect: appropriate and in normal range  Thought Content: no evidence of suicidal ideation or homicidal ideation  Thought Form: Logical and Perseverative  Insight: fair    Collateral Reports Completed: PHQ9 & GAD7    PLAN: Weekly to bi-weekly psychotherapy       Argelia Bowen [facility]

## 2023-06-12 ASSESSMENT — ANXIETY QUESTIONNAIRES
GAD7 TOTAL SCORE: 2
6. BECOMING EASILY ANNOYED OR IRRITABLE: NOT AT ALL
7. FEELING AFRAID AS IF SOMETHING AWFUL MIGHT HAPPEN: NOT AT ALL
1. FEELING NERVOUS, ANXIOUS, OR ON EDGE: SEVERAL DAYS
IF YOU CHECKED OFF ANY PROBLEMS ON THIS QUESTIONNAIRE, HOW DIFFICULT HAVE THESE PROBLEMS MADE IT FOR YOU TO DO YOUR WORK, TAKE CARE OF THINGS AT HOME, OR GET ALONG WITH OTHER PEOPLE: NOT DIFFICULT AT ALL
5. BEING SO RESTLESS THAT IT IS HARD TO SIT STILL: NOT AT ALL
GAD7 TOTAL SCORE: 2
2. NOT BEING ABLE TO STOP OR CONTROL WORRYING: NOT AT ALL
3. WORRYING TOO MUCH ABOUT DIFFERENT THINGS: SEVERAL DAYS

## 2023-06-12 ASSESSMENT — PATIENT HEALTH QUESTIONNAIRE - PHQ9: 5. POOR APPETITE OR OVEREATING: NOT AT ALL
